# Patient Record
Sex: MALE | Race: WHITE | Employment: UNEMPLOYED | URBAN - METROPOLITAN AREA
[De-identification: names, ages, dates, MRNs, and addresses within clinical notes are randomized per-mention and may not be internally consistent; named-entity substitution may affect disease eponyms.]

---

## 2017-01-01 ENCOUNTER — OFFICE VISIT (OUTPATIENT)
Dept: HEMATOLOGY/ONCOLOGY | Facility: HOSPITAL | Age: 62
End: 2017-01-01
Attending: INTERNAL MEDICINE
Payer: COMMERCIAL

## 2017-01-01 ENCOUNTER — OFFICE VISIT (OUTPATIENT)
Dept: SURGERY | Facility: CLINIC | Age: 62
End: 2017-01-01

## 2017-01-01 ENCOUNTER — HOSPITAL ENCOUNTER (OUTPATIENT)
Dept: MRI IMAGING | Facility: HOSPITAL | Age: 62
Discharge: HOME | End: 2017-01-01
Attending: INTERNAL MEDICINE
Payer: COMMERCIAL

## 2017-01-01 ENCOUNTER — APPOINTMENT (OUTPATIENT)
Dept: HEMATOLOGY/ONCOLOGY | Facility: HOSPITAL | Age: 62
End: 2017-01-01
Payer: COMMERCIAL

## 2017-01-01 ENCOUNTER — HOSPITAL ENCOUNTER (OUTPATIENT)
Dept: CT IMAGING | Facility: HOSPITAL | Age: 62
Discharge: HOME | End: 2017-01-01
Attending: INTERNAL MEDICINE
Payer: COMMERCIAL

## 2017-01-01 ENCOUNTER — APPOINTMENT (OUTPATIENT)
Dept: RADIATION ONCOLOGY | Facility: HOSPITAL | Age: 62
End: 2017-01-01
Attending: RADIOLOGY
Payer: COMMERCIAL

## 2017-01-01 VITALS
OXYGEN SATURATION: 100 % | TEMPERATURE: 97 F | BODY MASS INDEX: 35.16 KG/M2 | SYSTOLIC BLOOD PRESSURE: 136 MMHG | HEIGHT: 66.69 IN | WEIGHT: 218.63 LBS | RESPIRATION RATE: 18 BRPM | DIASTOLIC BLOOD PRESSURE: 81 MMHG | HEART RATE: 46 BPM | SYSTOLIC BLOOD PRESSURE: 117 MMHG | RESPIRATION RATE: 18 BRPM | OXYGEN SATURATION: 98 % | TEMPERATURE: 97 F | DIASTOLIC BLOOD PRESSURE: 71 MMHG | BODY MASS INDEX: 34.72 KG/M2 | HEART RATE: 54 BPM | WEIGHT: 221.38 LBS | HEIGHT: 66.69 IN

## 2017-01-01 VITALS
RESPIRATION RATE: 18 BRPM | BODY MASS INDEX: 35.73 KG/M2 | DIASTOLIC BLOOD PRESSURE: 75 MMHG | TEMPERATURE: 98 F | WEIGHT: 225 LBS | HEIGHT: 66.69 IN | HEART RATE: 46 BPM | SYSTOLIC BLOOD PRESSURE: 129 MMHG

## 2017-01-01 VITALS
DIASTOLIC BLOOD PRESSURE: 81 MMHG | HEIGHT: 66.69 IN | TEMPERATURE: 97 F | BODY MASS INDEX: 34.88 KG/M2 | HEART RATE: 48 BPM | RESPIRATION RATE: 18 BRPM | WEIGHT: 219.63 LBS | SYSTOLIC BLOOD PRESSURE: 125 MMHG

## 2017-01-01 VITALS
HEIGHT: 66.5 IN | TEMPERATURE: 98 F | DIASTOLIC BLOOD PRESSURE: 77 MMHG | WEIGHT: 219 LBS | BODY MASS INDEX: 34.78 KG/M2 | HEART RATE: 55 BPM | SYSTOLIC BLOOD PRESSURE: 117 MMHG

## 2017-01-01 VITALS
OXYGEN SATURATION: 99 % | RESPIRATION RATE: 18 BRPM | BODY MASS INDEX: 34 KG/M2 | TEMPERATURE: 98 F | SYSTOLIC BLOOD PRESSURE: 146 MMHG | WEIGHT: 211 LBS | DIASTOLIC BLOOD PRESSURE: 84 MMHG | HEART RATE: 48 BPM

## 2017-01-01 VITALS
SYSTOLIC BLOOD PRESSURE: 131 MMHG | HEIGHT: 66.5 IN | HEART RATE: 58 BPM | DIASTOLIC BLOOD PRESSURE: 72 MMHG | WEIGHT: 222.5 LBS | SYSTOLIC BLOOD PRESSURE: 103 MMHG | RESPIRATION RATE: 18 BRPM | WEIGHT: 220 LBS | HEART RATE: 50 BPM | HEIGHT: 66.69 IN | OXYGEN SATURATION: 98 % | TEMPERATURE: 97 F | TEMPERATURE: 98 F | BODY MASS INDEX: 35.33 KG/M2 | DIASTOLIC BLOOD PRESSURE: 68 MMHG | BODY MASS INDEX: 34.94 KG/M2

## 2017-01-01 VITALS
DIASTOLIC BLOOD PRESSURE: 80 MMHG | HEART RATE: 51 BPM | OXYGEN SATURATION: 98 % | BODY MASS INDEX: 34.7 KG/M2 | SYSTOLIC BLOOD PRESSURE: 120 MMHG | HEIGHT: 66.69 IN | WEIGHT: 218.5 LBS | RESPIRATION RATE: 18 BRPM | TEMPERATURE: 96 F

## 2017-01-01 VITALS
TEMPERATURE: 98 F | HEART RATE: 57 BPM | RESPIRATION RATE: 16 BRPM | OXYGEN SATURATION: 99 % | BODY MASS INDEX: 35.26 KG/M2 | HEIGHT: 66.69 IN | WEIGHT: 222 LBS | DIASTOLIC BLOOD PRESSURE: 80 MMHG | SYSTOLIC BLOOD PRESSURE: 121 MMHG

## 2017-01-01 VITALS
RESPIRATION RATE: 18 BRPM | WEIGHT: 222.19 LBS | HEART RATE: 48 BPM | BODY MASS INDEX: 35.29 KG/M2 | TEMPERATURE: 97 F | HEIGHT: 66.69 IN | SYSTOLIC BLOOD PRESSURE: 134 MMHG | DIASTOLIC BLOOD PRESSURE: 74 MMHG | OXYGEN SATURATION: 98 %

## 2017-01-01 VITALS
TEMPERATURE: 97 F | HEART RATE: 48 BPM | DIASTOLIC BLOOD PRESSURE: 71 MMHG | SYSTOLIC BLOOD PRESSURE: 139 MMHG | BODY MASS INDEX: 33.97 KG/M2 | RESPIRATION RATE: 18 BRPM | HEIGHT: 66.22 IN | WEIGHT: 211.38 LBS | OXYGEN SATURATION: 97 %

## 2017-01-01 VITALS
WEIGHT: 215 LBS | DIASTOLIC BLOOD PRESSURE: 78 MMHG | OXYGEN SATURATION: 98 % | HEART RATE: 45 BPM | RESPIRATION RATE: 18 BRPM | BODY MASS INDEX: 34 KG/M2 | TEMPERATURE: 98 F | SYSTOLIC BLOOD PRESSURE: 135 MMHG

## 2017-01-01 VITALS
HEIGHT: 66.22 IN | TEMPERATURE: 97 F | BODY MASS INDEX: 33.43 KG/M2 | HEART RATE: 42 BPM | WEIGHT: 208 LBS | SYSTOLIC BLOOD PRESSURE: 121 MMHG | RESPIRATION RATE: 18 BRPM | DIASTOLIC BLOOD PRESSURE: 70 MMHG

## 2017-01-01 DIAGNOSIS — L03.313 CELLULITIS OF CHEST WALL: ICD-10-CM

## 2017-01-01 DIAGNOSIS — C34.92 NON-SMALL CELL CARCINOMA OF LEFT LUNG METASTATIC TO ABDOMEN (HCC): Primary | ICD-10-CM

## 2017-01-01 DIAGNOSIS — E83.42 HYPOMAGNESEMIA: Primary | ICD-10-CM

## 2017-01-01 DIAGNOSIS — K59.00 CONSTIPATION, UNSPECIFIED CONSTIPATION TYPE: ICD-10-CM

## 2017-01-01 DIAGNOSIS — Z72.820 POOR SLEEP: ICD-10-CM

## 2017-01-01 DIAGNOSIS — D64.81 ANEMIA ASSOCIATED WITH CHEMOTHERAPY: ICD-10-CM

## 2017-01-01 DIAGNOSIS — C79.89 NON-SMALL CELL CARCINOMA OF LEFT LUNG METASTATIC TO ABDOMEN (HCC): Primary | ICD-10-CM

## 2017-01-01 DIAGNOSIS — T45.1X5A ANEMIA ASSOCIATED WITH CHEMOTHERAPY: ICD-10-CM

## 2017-01-01 DIAGNOSIS — T45.1X5A CHANGE OF SKIN RELATED TO CHEMOTHERAPY: ICD-10-CM

## 2017-01-01 DIAGNOSIS — C34.92 NON-SMALL CELL CARCINOMA OF LEFT LUNG METASTATIC TO ABDOMEN (HCC): ICD-10-CM

## 2017-01-01 DIAGNOSIS — C79.89 NON-SMALL CELL CARCINOMA OF LEFT LUNG METASTATIC TO ABDOMEN (HCC): ICD-10-CM

## 2017-01-01 DIAGNOSIS — R23.9 CHANGE OF SKIN RELATED TO CHEMOTHERAPY: ICD-10-CM

## 2017-01-01 DIAGNOSIS — E83.42 HYPOMAGNESEMIA: ICD-10-CM

## 2017-01-01 DIAGNOSIS — D70.1 CHEMOTHERAPY INDUCED NEUTROPENIA (HCC): ICD-10-CM

## 2017-01-01 DIAGNOSIS — R11.2 CHEMOTHERAPY INDUCED NAUSEA AND VOMITING: ICD-10-CM

## 2017-01-01 DIAGNOSIS — T45.1X5A CHEMOTHERAPY INDUCED NEUTROPENIA (HCC): ICD-10-CM

## 2017-01-01 DIAGNOSIS — R19.8 ALTERNATING CONSTIPATION AND DIARRHEA: ICD-10-CM

## 2017-01-01 DIAGNOSIS — L72.3 INFECTED SEBACEOUS CYST: Primary | ICD-10-CM

## 2017-01-01 DIAGNOSIS — L72.3 INFECTED SEBACEOUS CYST: ICD-10-CM

## 2017-01-01 DIAGNOSIS — T45.1X5A CHEMOTHERAPY INDUCED NAUSEA AND VOMITING: ICD-10-CM

## 2017-01-01 DIAGNOSIS — C79.31 BRAIN METASTASIS (HCC): ICD-10-CM

## 2017-01-01 DIAGNOSIS — I25.10 CORONARY ARTERY DISEASE INVOLVING NATIVE CORONARY ARTERY OF NATIVE HEART WITHOUT ANGINA PECTORIS: ICD-10-CM

## 2017-01-01 DIAGNOSIS — L08.9 INFECTED SEBACEOUS CYST: Primary | ICD-10-CM

## 2017-01-01 DIAGNOSIS — D70.1 CHEMOTHERAPY INDUCED NEUTROPENIA (HCC): Primary | ICD-10-CM

## 2017-01-01 DIAGNOSIS — I48.0 PAROXYSMAL ATRIAL FIBRILLATION (HCC): ICD-10-CM

## 2017-01-01 DIAGNOSIS — D69.6 THROMBOCYTOPENIA (HCC): ICD-10-CM

## 2017-01-01 DIAGNOSIS — T45.1X5A CHEMOTHERAPY INDUCED NEUTROPENIA (HCC): Primary | ICD-10-CM

## 2017-01-01 DIAGNOSIS — L08.9 INFECTED SEBACEOUS CYST: ICD-10-CM

## 2017-01-01 DIAGNOSIS — E78.2 MIXED HYPERLIPIDEMIA: ICD-10-CM

## 2017-01-01 LAB
ALBUMIN SERPL-MCNC: 3.3 G/DL (ref 3.5–4.8)
ALP LIVER SERPL-CCNC: 44 U/L (ref 45–117)
ALT SERPL-CCNC: 21 U/L (ref 17–63)
AST SERPL-CCNC: 23 U/L (ref 15–41)
BASOPHILS # BLD AUTO: 0.03 X10(3) UL (ref 0–0.1)
BASOPHILS # BLD AUTO: 0.04 X10(3) UL (ref 0–0.1)
BASOPHILS NFR BLD AUTO: 0.7 %
BASOPHILS NFR BLD AUTO: 1.5 %
BILIRUB SERPL-MCNC: 0.4 MG/DL (ref 0.1–2)
BUN BLD-MCNC: 11 MG/DL (ref 8–20)
BUN BLD-MCNC: 12 MG/DL (ref 8–20)
CALCIUM BLD-MCNC: 8.6 MG/DL (ref 8.3–10.3)
CALCIUM BLD-MCNC: 8.6 MG/DL (ref 8.3–10.3)
CHLORIDE: 107 MMOL/L (ref 101–111)
CHLORIDE: 109 MMOL/L (ref 101–111)
CO2: 26 MMOL/L (ref 22–32)
CO2: 27 MMOL/L (ref 22–32)
CREAT BLD-MCNC: 0.84 MG/DL (ref 0.7–1.3)
CREAT BLD-MCNC: 0.85 MG/DL (ref 0.7–1.3)
EOSINOPHIL # BLD AUTO: 0.02 X10(3) UL (ref 0–0.3)
EOSINOPHIL # BLD AUTO: 0.12 X10(3) UL (ref 0–0.3)
EOSINOPHIL NFR BLD AUTO: 0.7 %
EOSINOPHIL NFR BLD AUTO: 2.9 %
ERYTHROCYTE [DISTWIDTH] IN BLOOD BY AUTOMATED COUNT: 16.1 % (ref 11.5–16)
ERYTHROCYTE [DISTWIDTH] IN BLOOD BY AUTOMATED COUNT: 17.2 % (ref 11.5–16)
GLUCOSE BLD-MCNC: 76 MG/DL (ref 70–99)
GLUCOSE BLD-MCNC: 88 MG/DL (ref 70–99)
HAV IGM SER QL: 1.4 MG/DL (ref 1.7–3)
HAV IGM SER QL: 1.8 MG/DL (ref 1.7–3)
HCT VFR BLD AUTO: 32.4 % (ref 37–53)
HCT VFR BLD AUTO: 34.7 % (ref 37–53)
HGB BLD-MCNC: 10.5 G/DL (ref 13–17)
HGB BLD-MCNC: 11.5 G/DL (ref 13–17)
IMMATURE GRANULOCYTE COUNT: 0.01 X10(3) UL (ref 0–1)
IMMATURE GRANULOCYTE COUNT: 0.06 X10(3) UL (ref 0–1)
IMMATURE GRANULOCYTE RATIO %: 0.2 %
IMMATURE GRANULOCYTE RATIO %: 2.2 %
LYMPHOCYTES # BLD AUTO: 1.4 X10(3) UL (ref 0.9–4)
LYMPHOCYTES # BLD AUTO: 1.41 X10(3) UL (ref 0.9–4)
LYMPHOCYTES NFR BLD AUTO: 33.6 %
LYMPHOCYTES NFR BLD AUTO: 52 %
M PROTEIN MFR SERPL ELPH: 6.4 G/DL (ref 6.1–8.3)
MCH RBC QN AUTO: 30.8 PG (ref 27–33.2)
MCH RBC QN AUTO: 31.7 PG (ref 27–33.2)
MCHC RBC AUTO-ENTMCNC: 32.4 G/DL (ref 31–37)
MCHC RBC AUTO-ENTMCNC: 33.1 G/DL (ref 31–37)
MCV RBC AUTO: 95 FL (ref 80–99)
MCV RBC AUTO: 95.6 FL (ref 80–99)
MONOCYTES # BLD AUTO: 0.16 X10(3) UL (ref 0.1–0.6)
MONOCYTES # BLD AUTO: 0.75 X10(3) UL (ref 0.1–0.6)
MONOCYTES NFR BLD AUTO: 18 %
MONOCYTES NFR BLD AUTO: 5.9 %
NEUTROPHIL ABS PRELIM: 1.02 X10 (3) UL (ref 1.3–6.7)
NEUTROPHIL ABS PRELIM: 1.86 X10 (3) UL (ref 1.3–6.7)
NEUTROPHILS # BLD AUTO: 1.02 X10(3) UL (ref 1.3–6.7)
NEUTROPHILS # BLD AUTO: 1.86 X10(3) UL (ref 1.3–6.7)
NEUTROPHILS NFR BLD AUTO: 37.7 %
NEUTROPHILS NFR BLD AUTO: 44.6 %
PLATELET # BLD AUTO: 247 10(3)UL (ref 150–450)
PLATELET # BLD AUTO: 306 10(3)UL (ref 150–450)
POTASSIUM SERPL-SCNC: 3.8 MMOL/L (ref 3.6–5.1)
POTASSIUM SERPL-SCNC: 4.1 MMOL/L (ref 3.6–5.1)
RBC # BLD AUTO: 3.41 X10(6)UL (ref 4.3–5.7)
RBC # BLD AUTO: 3.63 X10(6)UL (ref 4.3–5.7)
RED CELL DISTRIBUTION WIDTH-SD: 55.3 FL (ref 35.1–46.3)
RED CELL DISTRIBUTION WIDTH-SD: 59.3 FL (ref 35.1–46.3)
SODIUM SERPL-SCNC: 140 MMOL/L (ref 136–144)
SODIUM SERPL-SCNC: 143 MMOL/L (ref 136–144)
WBC # BLD AUTO: 2.7 X10(3) UL (ref 4–13)
WBC # BLD AUTO: 4.2 X10(3) UL (ref 4–13)

## 2017-01-01 PROCEDURE — 99215 OFFICE O/P EST HI 40 MIN: CPT | Performed by: INTERNAL MEDICINE

## 2017-01-01 PROCEDURE — 83735 ASSAY OF MAGNESIUM: CPT

## 2017-01-01 PROCEDURE — 10061 I&D ABSCESS COMP/MULTIPLE: CPT | Performed by: SURGERY

## 2017-01-01 PROCEDURE — 96375 TX/PRO/DX INJ NEW DRUG ADDON: CPT

## 2017-01-01 PROCEDURE — 71260 CT THORAX DX C+: CPT | Performed by: INTERNAL MEDICINE

## 2017-01-01 PROCEDURE — 96417 CHEMO IV INFUS EACH ADDL SEQ: CPT

## 2017-01-01 PROCEDURE — 80048 BASIC METABOLIC PNL TOTAL CA: CPT

## 2017-01-01 PROCEDURE — 85025 COMPLETE CBC W/AUTO DIFF WBC: CPT

## 2017-01-01 PROCEDURE — 96367 TX/PROPH/DG ADDL SEQ IV INF: CPT

## 2017-01-01 PROCEDURE — 96413 CHEMO IV INFUSION 1 HR: CPT

## 2017-01-01 PROCEDURE — 80053 COMPREHEN METABOLIC PANEL: CPT

## 2017-01-01 PROCEDURE — A9575 INJ GADOTERATE MEGLUMI 0.1ML: HCPCS | Performed by: INTERNAL MEDICINE

## 2017-01-01 PROCEDURE — 99211 OFF/OP EST MAY X REQ PHY/QHP: CPT

## 2017-01-01 PROCEDURE — 70553 MRI BRAIN STEM W/O & W/DYE: CPT | Performed by: INTERNAL MEDICINE

## 2017-01-01 PROCEDURE — 96366 THER/PROPH/DIAG IV INF ADDON: CPT

## 2017-01-01 PROCEDURE — 96365 THER/PROPH/DIAG IV INF INIT: CPT

## 2017-01-01 PROCEDURE — 96361 HYDRATE IV INFUSION ADD-ON: CPT

## 2017-01-01 PROCEDURE — 99214 OFFICE O/P EST MOD 30 MIN: CPT | Performed by: NURSE PRACTITIONER

## 2017-01-01 PROCEDURE — 96372 THER/PROPH/DIAG INJ SC/IM: CPT

## 2017-01-01 PROCEDURE — 96376 TX/PRO/DX INJ SAME DRUG ADON: CPT

## 2017-01-01 PROCEDURE — 74177 CT ABD & PELVIS W/CONTRAST: CPT | Performed by: INTERNAL MEDICINE

## 2017-01-01 PROCEDURE — 99243 OFF/OP CNSLTJ NEW/EST LOW 30: CPT | Performed by: SURGERY

## 2017-01-01 PROCEDURE — 99212 OFFICE O/P EST SF 10 MIN: CPT | Performed by: SURGERY

## 2017-01-01 PROCEDURE — 99024 POSTOP FOLLOW-UP VISIT: CPT | Performed by: SURGERY

## 2017-01-01 PROCEDURE — 80061 LIPID PANEL: CPT

## 2017-01-01 RX ORDER — PROCHLORPERAZINE MALEATE 10 MG
10 TABLET ORAL EVERY 6 HOURS PRN
Status: CANCELLED | OUTPATIENT
Start: 2017-01-01

## 2017-01-01 RX ORDER — SODIUM CHLORIDE 0.9 % (FLUSH) 0.9 %
10 SYRINGE (ML) INJECTION ONCE
Status: COMPLETED | OUTPATIENT
Start: 2017-01-01 | End: 2017-01-01

## 2017-01-01 RX ORDER — LORAZEPAM 0.5 MG/1
TABLET ORAL EVERY 4 HOURS PRN
Status: CANCELLED | OUTPATIENT
Start: 2017-01-01

## 2017-01-01 RX ORDER — ONDANSETRON 2 MG/ML
8 INJECTION INTRAMUSCULAR; INTRAVENOUS EVERY 6 HOURS PRN
Status: CANCELLED | OUTPATIENT
Start: 2017-01-01

## 2017-01-01 RX ORDER — DIPHENHYDRAMINE HYDROCHLORIDE 50 MG/ML
INJECTION INTRAMUSCULAR; INTRAVENOUS EVERY 4 HOURS PRN
Status: CANCELLED | OUTPATIENT
Start: 2017-01-01

## 2017-01-01 RX ORDER — SODIUM CHLORIDE 0.9 % (FLUSH) 0.9 %
10 SYRINGE (ML) INJECTION ONCE
Status: CANCELLED | OUTPATIENT
Start: 2017-01-01

## 2017-01-01 RX ORDER — ALBUTEROL SULFATE 90 UG/1
2 AEROSOL, METERED RESPIRATORY (INHALATION) AS NEEDED
Status: CANCELLED | OUTPATIENT
Start: 2017-01-01

## 2017-01-01 RX ORDER — LORAZEPAM 2 MG/ML
INJECTION INTRAMUSCULAR EVERY 4 HOURS PRN
Status: CANCELLED | OUTPATIENT
Start: 2017-01-01

## 2017-01-01 RX ORDER — METOCLOPRAMIDE HYDROCHLORIDE 5 MG/ML
10 INJECTION INTRAMUSCULAR; INTRAVENOUS EVERY 6 HOURS PRN
Status: CANCELLED | OUTPATIENT
Start: 2017-01-01

## 2017-01-01 RX ORDER — ACETAMINOPHEN 325 MG/1
TABLET ORAL EVERY 6 HOURS PRN
Status: CANCELLED | OUTPATIENT
Start: 2017-01-01

## 2017-01-01 RX ORDER — RANITIDINE 25 MG/ML
50 INJECTION, SOLUTION INTRAMUSCULAR; INTRAVENOUS AS NEEDED
Status: CANCELLED | OUTPATIENT
Start: 2017-01-01

## 2017-01-01 RX ORDER — ASPIRIN 81 MG
TABLET, DELAYED RELEASE (ENTERIC COATED) ORAL
Qty: 90 TABLET | Refills: 2 | OUTPATIENT
Start: 2017-01-01

## 2017-01-01 RX ORDER — MEPERIDINE HYDROCHLORIDE 25 MG/ML
INJECTION INTRAMUSCULAR; INTRAVENOUS; SUBCUTANEOUS AS NEEDED
Status: CANCELLED | OUTPATIENT
Start: 2017-01-01

## 2017-01-01 RX ORDER — SODIUM CHLORIDE 9 MG/ML
1000 INJECTION, SOLUTION INTRAVENOUS ONCE
Status: COMPLETED | OUTPATIENT
Start: 2017-01-01 | End: 2017-01-01

## 2017-01-01 RX ORDER — MINOCYCLINE HYDROCHLORIDE 100 MG/1
100 TABLET ORAL 2 TIMES DAILY
Qty: 60 TABLET | Refills: 3 | Status: SHIPPED | OUTPATIENT
Start: 2017-01-01 | End: 2018-01-01

## 2017-01-01 RX ORDER — FUROSEMIDE 10 MG/ML
10 INJECTION INTRAMUSCULAR; INTRAVENOUS ONCE
Status: COMPLETED | OUTPATIENT
Start: 2017-01-01 | End: 2017-01-01

## 2017-01-01 RX ORDER — SODIUM CHLORIDE 9 MG/ML
1000 INJECTION, SOLUTION INTRAVENOUS ONCE
Status: CANCELLED | OUTPATIENT
Start: 2017-01-01

## 2017-01-01 RX ORDER — PRAVASTATIN SODIUM 20 MG
TABLET ORAL
Qty: 90 TABLET | Refills: 0 | Status: SHIPPED | OUTPATIENT
Start: 2017-01-01 | End: 2018-01-01

## 2017-01-01 RX ORDER — FUROSEMIDE 10 MG/ML
10 INJECTION INTRAMUSCULAR; INTRAVENOUS ONCE
Status: CANCELLED
Start: 2017-01-01 | End: 2017-01-01

## 2017-01-01 RX ORDER — AMOXICILLIN AND CLAVULANATE POTASSIUM 875; 125 MG/1; MG/1
1 TABLET, FILM COATED ORAL 2 TIMES DAILY
Qty: 20 TABLET | Refills: 0 | Status: SHIPPED | OUTPATIENT
Start: 2017-01-01 | End: 2017-01-01 | Stop reason: ALTCHOICE

## 2017-01-01 RX ORDER — SODIUM CHLORIDE 9 MG/ML
1000 INJECTION, SOLUTION INTRAVENOUS ONCE
Status: DISCONTINUED | OUTPATIENT
Start: 2017-01-01 | End: 2017-01-01

## 2017-01-01 RX ORDER — DOXYCYCLINE HYCLATE 100 MG/1
CAPSULE ORAL
Qty: 60 CAPSULE | Refills: 0 | Status: SHIPPED | OUTPATIENT
Start: 2017-01-01 | End: 2017-01-01

## 2017-01-01 RX ADMIN — SODIUM CHLORIDE 1000 ML: 9 INJECTION, SOLUTION INTRAVENOUS at 16:06:00

## 2017-01-01 RX ADMIN — SODIUM CHLORIDE 0.9 % (FLUSH) 10 ML: 0.9 % SYRINGE (ML) INJECTION at 13:25:00

## 2017-01-01 RX ADMIN — SODIUM CHLORIDE 0.9 % (FLUSH) 10 ML: 0.9 % SYRINGE (ML) INJECTION at 11:00:00

## 2017-01-01 RX ADMIN — SODIUM CHLORIDE 0.9 % (FLUSH) 10 ML: 0.9 % SYRINGE (ML) INJECTION at 09:15:00

## 2017-01-01 RX ADMIN — SODIUM CHLORIDE 0.9 % (FLUSH) 10 ML: 0.9 % SYRINGE (ML) INJECTION at 16:15:00

## 2017-01-01 RX ADMIN — SODIUM CHLORIDE 0.9 % (FLUSH) 10 ML: 0.9 % SYRINGE (ML) INJECTION at 16:35:00

## 2017-01-01 RX ADMIN — SODIUM CHLORIDE 0.9 % (FLUSH) 10 ML: 0.9 % SYRINGE (ML) INJECTION at 12:45:00

## 2017-01-01 RX ADMIN — SODIUM CHLORIDE 0.9 % (FLUSH) 10 ML: 0.9 % SYRINGE (ML) INJECTION at 11:45:00

## 2017-01-01 RX ADMIN — SODIUM CHLORIDE 0.9 % (FLUSH) 10 ML: 0.9 % SYRINGE (ML) INJECTION at 12:00:00

## 2017-01-01 RX ADMIN — FUROSEMIDE 10 MG: 10 INJECTION INTRAMUSCULAR; INTRAVENOUS at 14:32:00

## 2017-01-01 RX ADMIN — SODIUM CHLORIDE 1000 ML: 9 INJECTION, SOLUTION INTRAVENOUS at 15:25:00

## 2017-01-01 RX ADMIN — SODIUM CHLORIDE 0.9 % (FLUSH) 10 ML: 0.9 % SYRINGE (ML) INJECTION at 13:00:00

## 2017-02-02 DIAGNOSIS — I25.10 CORONARY ARTERY DISEASE INVOLVING NATIVE CORONARY ARTERY OF NATIVE HEART WITHOUT ANGINA PECTORIS: ICD-10-CM

## 2017-02-02 DIAGNOSIS — E78.2 MIXED HYPERLIPIDEMIA: ICD-10-CM

## 2017-02-02 RX ORDER — PRAVASTATIN SODIUM 20 MG
20 TABLET ORAL NIGHTLY
Qty: 90 TABLET | Refills: 3 | Status: CANCELLED | OUTPATIENT
Start: 2017-02-02

## 2017-02-02 NOTE — TELEPHONE ENCOUNTER
From: Paras Banda  To:  Merced Johns DO  Sent: 2/2/2017 1:10 PM CST  Subject: Medication Renewal Request    Original authorizing provider: DO Paras Kinsey would like a refill of the following medications:  Pravastatin Sodium 20 M

## 2017-02-08 ENCOUNTER — HOSPITAL ENCOUNTER (OUTPATIENT)
Dept: INTERVENTIONAL RADIOLOGY/VASCULAR | Facility: HOSPITAL | Age: 62
Setting detail: OBSERVATION
Discharge: HOME | End: 2017-02-09
Attending: INTERNAL MEDICINE | Admitting: INTERNAL MEDICINE
Payer: COMMERCIAL

## 2017-02-08 DIAGNOSIS — R94.39 ABNORMAL STRESS TEST: ICD-10-CM

## 2017-02-08 LAB
BUN BLD-MCNC: 7 MG/DL (ref 8–20)
CALCIUM BLD-MCNC: 8.4 MG/DL (ref 8.3–10.3)
CHLORIDE: 110 MMOL/L (ref 101–111)
CO2: 27 MMOL/L (ref 22–32)
CREAT BLD-MCNC: 0.8 MG/DL (ref 0.7–1.3)
GLUCOSE BLD-MCNC: 78 MG/DL (ref 70–99)
ISTAT ACTIVATED CLOTTING TIME: 322 SECONDS (ref 74–137)
POTASSIUM SERPL-SCNC: 4.1 MMOL/L (ref 3.6–5.1)
SODIUM SERPL-SCNC: 144 MMOL/L (ref 136–144)

## 2017-02-08 PROCEDURE — 99152 MOD SED SAME PHYS/QHP 5/>YRS: CPT

## 2017-02-08 PROCEDURE — B2151ZZ FLUOROSCOPY OF LEFT HEART USING LOW OSMOLAR CONTRAST: ICD-10-PCS | Performed by: INTERNAL MEDICINE

## 2017-02-08 PROCEDURE — 93459 L HRT ART/GRFT ANGIO: CPT

## 2017-02-08 PROCEDURE — 80048 BASIC METABOLIC PNL TOTAL CA: CPT | Performed by: INTERNAL MEDICINE

## 2017-02-08 PROCEDURE — 99153 MOD SED SAME PHYS/QHP EA: CPT

## 2017-02-08 PROCEDURE — B2181ZZ FLUOROSCOPY OF LEFT INTERNAL MAMMARY BYPASS GRAFT USING LOW OSMOLAR CONTRAST: ICD-10-PCS | Performed by: INTERNAL MEDICINE

## 2017-02-08 PROCEDURE — 027034Z DILATION OF CORONARY ARTERY, ONE ARTERY WITH DRUG-ELUTING INTRALUMINAL DEVICE, PERCUTANEOUS APPROACH: ICD-10-PCS | Performed by: INTERNAL MEDICINE

## 2017-02-08 PROCEDURE — 4A023N7 MEASUREMENT OF CARDIAC SAMPLING AND PRESSURE, LEFT HEART, PERCUTANEOUS APPROACH: ICD-10-PCS | Performed by: INTERNAL MEDICINE

## 2017-02-08 PROCEDURE — 85347 COAGULATION TIME ACTIVATED: CPT

## 2017-02-08 PROCEDURE — B2131ZZ FLUOROSCOPY OF MULTIPLE CORONARY ARTERY BYPASS GRAFTS USING LOW OSMOLAR CONTRAST: ICD-10-PCS | Performed by: INTERNAL MEDICINE

## 2017-02-08 PROCEDURE — 93010 ELECTROCARDIOGRAM REPORT: CPT | Performed by: INTERNAL MEDICINE

## 2017-02-08 PROCEDURE — B2111ZZ FLUOROSCOPY OF MULTIPLE CORONARY ARTERIES USING LOW OSMOLAR CONTRAST: ICD-10-PCS | Performed by: INTERNAL MEDICINE

## 2017-02-08 PROCEDURE — 93005 ELECTROCARDIOGRAM TRACING: CPT

## 2017-02-08 RX ORDER — CARVEDILOL 6.25 MG/1
6.25 TABLET ORAL 2 TIMES DAILY WITH MEALS
Status: DISCONTINUED | OUTPATIENT
Start: 2017-02-08 | End: 2017-02-09

## 2017-02-08 RX ORDER — ACETAMINOPHEN AND CODEINE PHOSPHATE 300; 30 MG/1; MG/1
1 TABLET ORAL EVERY 4 HOURS PRN
Status: DISCONTINUED | OUTPATIENT
Start: 2017-02-08 | End: 2017-02-09

## 2017-02-08 RX ORDER — CLOPIDOGREL BISULFATE 75 MG/1
75 TABLET ORAL DAILY
Status: DISCONTINUED | OUTPATIENT
Start: 2017-02-09 | End: 2017-02-09

## 2017-02-08 RX ORDER — SODIUM CHLORIDE 9 MG/ML
INJECTION, SOLUTION INTRAVENOUS CONTINUOUS
Status: DISCONTINUED | OUTPATIENT
Start: 2017-02-08 | End: 2017-02-09

## 2017-02-08 RX ORDER — ASPIRIN 325 MG
325 TABLET ORAL DAILY
Status: DISCONTINUED | OUTPATIENT
Start: 2017-02-09 | End: 2017-02-09

## 2017-02-08 RX ORDER — HEPARIN SODIUM 5000 [USP'U]/ML
INJECTION, SOLUTION INTRAVENOUS; SUBCUTANEOUS
Status: COMPLETED
Start: 2017-02-08 | End: 2017-02-08

## 2017-02-08 RX ORDER — ACETAMINOPHEN 325 MG/1
650 TABLET ORAL EVERY 4 HOURS PRN
Status: DISCONTINUED | OUTPATIENT
Start: 2017-02-08 | End: 2017-02-09

## 2017-02-08 RX ORDER — SODIUM CHLORIDE 9 MG/ML
INJECTION, SOLUTION INTRAVENOUS CONTINUOUS
Status: ACTIVE | OUTPATIENT
Start: 2017-02-08 | End: 2017-02-08

## 2017-02-08 RX ORDER — ACETAMINOPHEN AND CODEINE PHOSPHATE 300; 30 MG/1; MG/1
2 TABLET ORAL EVERY 4 HOURS PRN
Status: DISCONTINUED | OUTPATIENT
Start: 2017-02-08 | End: 2017-02-09

## 2017-02-08 RX ORDER — DILTIAZEM HYDROCHLORIDE 5 MG/ML
INJECTION INTRAVENOUS
Status: COMPLETED
Start: 2017-02-08 | End: 2017-02-08

## 2017-02-08 RX ORDER — LIDOCAINE HYDROCHLORIDE 10 MG/ML
INJECTION, SOLUTION INFILTRATION; PERINEURAL
Status: COMPLETED
Start: 2017-02-08 | End: 2017-02-08

## 2017-02-08 RX ORDER — PRAVASTATIN SODIUM 20 MG
80 TABLET ORAL NIGHTLY
Status: DISCONTINUED | OUTPATIENT
Start: 2017-02-08 | End: 2017-02-09

## 2017-02-08 RX ORDER — MIDAZOLAM HYDROCHLORIDE 1 MG/ML
INJECTION INTRAMUSCULAR; INTRAVENOUS
Status: COMPLETED
Start: 2017-02-08 | End: 2017-02-08

## 2017-02-08 RX ORDER — CLOPIDOGREL BISULFATE 75 MG/1
TABLET ORAL
Status: COMPLETED
Start: 2017-02-08 | End: 2017-02-08

## 2017-02-08 RX ADMIN — CARVEDILOL 6.25 MG: 6.25 TABLET ORAL at 18:26:00

## 2017-02-08 RX ADMIN — PRAVASTATIN SODIUM 80 MG: 20 MG TABLET ORAL at 21:00:00

## 2017-02-08 NOTE — H&P
Interventional cardiology H&P update:        Patient personally interviewed and examined in the holding area.     There has been no significant change since outpatient history and physical.                              Gail Marx M.D.

## 2017-02-08 NOTE — BRIEF PROCEDURE NOTE
[address] Patient Status:  Outpatient in a Bed    1955 MRN [id number]   Location [facility] [address] [facility] Attending Sergio Mott MD   [facility] Day # 0 PCP Getachew Tate DO                            Cardiac Ca follow. Lashon Ocasio M.D.   [address] [facility] Cardiology         2/8/2017   12:57 PM

## 2017-02-08 NOTE — PLAN OF CARE
NURSING ADMISSION NOTE      Patient admitted via bed from cath lab holding in a sitting position. Oriented to room. Safety precautions initiated. Bed in low position. Call light in reach. Tele placed on patient. IV flushed and capped.  Right groi

## 2017-02-09 VITALS
HEIGHT: 70 IN | DIASTOLIC BLOOD PRESSURE: 65 MMHG | HEART RATE: 58 BPM | WEIGHT: 187 LBS | OXYGEN SATURATION: 96 % | SYSTOLIC BLOOD PRESSURE: 120 MMHG | BODY MASS INDEX: 26.77 KG/M2 | RESPIRATION RATE: 18 BRPM | TEMPERATURE: 98 F

## 2017-02-09 PROBLEM — R94.39 ABNORMAL STRESS TEST: Status: ACTIVE | Noted: 2017-02-09

## 2017-02-09 LAB
ATRIAL RATE: 47 BPM
BUN BLD-MCNC: 12 MG/DL (ref 8–20)
CALCIUM BLD-MCNC: 9.1 MG/DL (ref 8.3–10.3)
CHLORIDE: 107 MMOL/L (ref 101–111)
CO2: 26 MMOL/L (ref 22–32)
CREAT BLD-MCNC: 0.95 MG/DL (ref 0.7–1.3)
ERYTHROCYTE [DISTWIDTH] IN BLOOD BY AUTOMATED COUNT: 13.2 % (ref 11.5–16)
GLUCOSE BLD-MCNC: 76 MG/DL (ref 70–99)
HCT VFR BLD AUTO: 43.4 % (ref 37–53)
HGB BLD-MCNC: 14.6 G/DL (ref 13–17)
MCH RBC QN AUTO: 30.4 PG (ref 27–33.2)
MCHC RBC AUTO-ENTMCNC: 33.6 G/DL (ref 31–37)
MCV RBC AUTO: 90.4 FL (ref 80–99)
P AXIS: 51 DEGREES
P-R INTERVAL: 186 MS
PLATELET # BLD AUTO: 168 10(3)UL (ref 150–450)
POTASSIUM SERPL-SCNC: 4.1 MMOL/L (ref 3.6–5.1)
Q-T INTERVAL: 486 MS
QRS DURATION: 94 MS
QTC CALCULATION (BEZET): 430 MS
R AXIS: -16 DEGREES
RBC # BLD AUTO: 4.8 X10(6)UL (ref 4.3–5.7)
RED CELL DISTRIBUTION WIDTH-SD: 43.8 FL (ref 35.1–46.3)
SODIUM SERPL-SCNC: 141 MMOL/L (ref 136–144)
T AXIS: 47 DEGREES
VENTRICULAR RATE: 47 BPM
WBC # BLD AUTO: 7.6 X10(3) UL (ref 4–13)

## 2017-02-09 PROCEDURE — 85027 COMPLETE CBC AUTOMATED: CPT | Performed by: INTERNAL MEDICINE

## 2017-02-09 PROCEDURE — 80048 BASIC METABOLIC PNL TOTAL CA: CPT | Performed by: INTERNAL MEDICINE

## 2017-02-09 PROCEDURE — 99211 OFF/OP EST MAY X REQ PHY/QHP: CPT

## 2017-02-09 RX ORDER — ASPIRIN 325 MG
TABLET ORAL
Qty: 90 TABLET | Refills: 1 | Status: SHIPPED | OUTPATIENT
Start: 2017-02-09 | End: 2017-06-21

## 2017-02-09 RX ORDER — AMIODARONE HYDROCHLORIDE 200 MG/1
200 TABLET ORAL DAILY
Qty: 90 TABLET | Refills: 3 | Status: SHIPPED | OUTPATIENT
Start: 2017-02-09 | End: 2018-01-01

## 2017-02-09 RX ORDER — ASPIRIN 325 MG
TABLET ORAL
Qty: 90 TABLET | Refills: 1 | Status: SHIPPED | OUTPATIENT
Start: 2017-02-09 | End: 2017-02-09

## 2017-02-09 RX ORDER — CLOPIDOGREL BISULFATE 75 MG/1
75 TABLET ORAL DAILY
Qty: 90 TABLET | Refills: 3 | Status: SHIPPED | OUTPATIENT
Start: 2017-02-09 | End: 2017-01-01 | Stop reason: ALTCHOICE

## 2017-02-09 RX ADMIN — CARVEDILOL 6.25 MG: 6.25 TABLET ORAL at 08:35:00

## 2017-02-09 RX ADMIN — ASPIRIN 325 MG: 325 MG TABLET ORAL at 08:35:00

## 2017-02-09 RX ADMIN — CLOPIDOGREL BISULFATE 75 MG: 75 TABLET ORAL at 08:35:00

## 2017-02-09 NOTE — CARDIAC REHAB
CAD education completed with patient. Gave patient information about Romel Salgado. Patient did not want to sign up for cardiac rehab at this time. Stent card given to patient.

## 2017-02-09 NOTE — PLAN OF CARE
CARDIOVASCULAR - ADULT    • Maintains optimal cardiac output and hemodynamic stability Progressing    • Absence of cardiac arrhythmias or at baseline Progressing        02.08.17 2100 on & off cp for the last 2 months, not always exertional   Abnormal nucle

## 2017-02-09 NOTE — PLAN OF CARE
NURSING DISCHARGE NOTE    Discharged home via private car. Accompanied by wife. Belongings sent home with patient. Tele off. IV removed. All discharge instructions reviewed with the patient. All questions and concerns addressed.

## 2017-02-09 NOTE — PAYOR COMM NOTE
Attending Physician: Chiqui Dumont MD    Review Type: ADMISSION   Reviewer: Srikanth Bolivar       Date: February 9, 2017 - 9:02 AM  Payor: Maximo OROZCO/JHOAN  Authorization Number: none reqd  Admit date: 2/8/2017  8:11 AM   Admitted from Emergency Dept.

## 2017-02-09 NOTE — DIETARY NOTE
Nutrition Short Note   Dietitian consult received per cardiac rehab standing order. Pt to be educated by cardiac rehab staff and encouraged to attend outpatient classes taught by RD. RD available PRN.      Kathy Zapata RD, LDN  Pager [phone]

## 2017-02-09 NOTE — PROCEDURES
[address] Patient Status:  Observation    1955 MRN [id number]   [facility] 2NE-A Attending Martha Savage MD   Hosp Day # 0 PCP Debbie Faria DO                                              Cardiac Catheter angiography was performed in multiple projections.   That same catheter was manipulated into the left subclavian artery and engaged in the left internal mammary artery and selective left internal Delwin Asai mammary artery angiography is accomplished in multiple saphenous vein bypass graft going from the ascending aorta to the obtuse marginal branch. Proximal and distal anastomosis is appear to be patent.   There is a 99% subtotal stenosis in the proximal segment of the vein graft with only LUCAS grade II flow dist

## 2017-02-09 NOTE — PROGRESS NOTES
[facility] Cardiology Progress Note        Janet Burns Patient Status:  Observation    1955 MRN [id number]   [facility] 2NE-A Attending Jesse Rehman MD   [facility] Day # 1 PCP Padmini Romano good chest wall expansion  Abdomen: Soft, non-tender, non-distended.     Extremities:  Peripheral pulses are  2+, no edema.,  Right groin dry, no hematoma minimal ecchymosis  Neurologic: Alert , ambulatory  Skin: Warm and dry, no obvious rashes     MEDICATI

## 2017-02-09 NOTE — PROCEDURES
[facility]    PATIENT'S NAME: Teresa Meyer   ATTENDING PHYSICIAN: Stuart Haro M.D. OPERATING PHYSICIAN: Stuart Haro M.D.    PATIENT ACCOUNT#:   [de-identified]    LOCATION:  [address] [place] [address]  MEDICAL RECORD #:   [id number]       DATE OF AJITH Post-PTCA angiography now showed approximately 80% residual stenosis, but now there was LUCAS grade 3 distal flow distally. Then, the original 2.5 x 12 Trek balloon was prepared. It was loaded onto the guidewire and advanced to the site of the lesion.   Tw

## 2017-04-12 ENCOUNTER — PATIENT MESSAGE (OUTPATIENT)
Dept: FAMILY MEDICINE CLINIC | Facility: CLINIC | Age: 62
End: 2017-04-12

## 2017-04-12 NOTE — TELEPHONE ENCOUNTER
From: Alfonso Doyle  To: Daniel Leung DO  Sent: 4/12/2017 8:32 AM CDT  Subject: Visit Follow-up Question    I am spitting up very minute amounts of blood every so often. It may be related to my inability to burp which still randomly occurs.  Should I s

## 2017-05-16 NOTE — TELEPHONE ENCOUNTER
Pt requesting refill on vitamin, no protocol, unable to approve    LOV 11/7/16    LF 12/12/16 #30 w/3    Future Appointments  Date Time Provider Sultana Miller   6/12/2017 10:50 AM Esperanza Gonzalez  [address]

## 2017-05-16 NOTE — TELEPHONE ENCOUNTER
From: Cristiana Zhou  To:  Christi Mena DO  Sent: 5/16/2017 2:49 PM CDT  Subject: Medication Renewal Request    Original authorizing provider: DO Cristiana Hastings would like a refill of the following medications:  TAB-A-HERMES Oral Tab [L

## 2017-05-17 RX ORDER — MULTIVITAMIN WITH FOLIC ACID 400 MCG
1 TABLET ORAL
Qty: 30 TABLET | Refills: 12 | Status: SHIPPED | OUTPATIENT
Start: 2017-05-17

## 2017-05-26 PROBLEM — R10.13 DYSPEPSIA: Status: ACTIVE | Noted: 2017-05-25

## 2017-06-16 NOTE — TELEPHONE ENCOUNTER
Scheduled patient an appointment over the phone. Patient states that is concerned as episodes of  Blood in sputum seem to be more frequent, patient reports that blood is still trace.  Directed patient to seek emergency treatment if more than a nickel size a

## 2017-06-19 ENCOUNTER — OFFICE VISIT (OUTPATIENT)
Dept: FAMILY MEDICINE CLINIC | Facility: CLINIC | Age: 62
End: 2017-06-19

## 2017-06-19 VITALS
OXYGEN SATURATION: 98 % | SYSTOLIC BLOOD PRESSURE: 102 MMHG | DIASTOLIC BLOOD PRESSURE: 68 MMHG | TEMPERATURE: 98 F | WEIGHT: 187 LBS | HEART RATE: 45 BPM | RESPIRATION RATE: 17 BRPM | BODY MASS INDEX: 27 KG/M2

## 2017-06-19 DIAGNOSIS — R04.2 HEMOPTYSIS: Primary | ICD-10-CM

## 2017-06-19 DIAGNOSIS — I25.10 CORONARY ARTERY DISEASE INVOLVING NATIVE CORONARY ARTERY OF NATIVE HEART WITHOUT ANGINA PECTORIS: ICD-10-CM

## 2017-06-19 DIAGNOSIS — I48.91 ATRIAL FIBRILLATION WITH RAPID VENTRICULAR RESPONSE (HCC): ICD-10-CM

## 2017-06-19 PROCEDURE — 99214 OFFICE O/P EST MOD 30 MIN: CPT | Performed by: FAMILY MEDICINE

## 2017-06-19 NOTE — PROGRESS NOTES
CHIEF COMPLAINT: Patient presents with:  Spitting Up: blood in sputum     HPI:     Joanna Dukes is a 65year old male presents for evaluation of hemoptysis that started as once a month or less about 9 months ago in September 2016 while patient was only reconstruction for shoulder stabilization. Had chronic shoulder dislocations and history of humeral head fracture. OTHER SURGICAL HISTORY  8/28/2007    Comment L shoulder.  (Dx: impingment, AC arthopathy & degenerative labral tear) Arthroscopic acromiopl mg daily.  Take 1 tablet daily until instructed to reduce dose to 81 mg daily ) Disp: 90 tablet Rfl: 1       Allergies:  No Known Allergies    PSFH elements reviewed from today and agreed except as otherwise stated in HPI.  ROS:     Review of Systems  Posit (CPT=71275);  Future  - [facility] pulmonary referral  Continue carvedilol, amiodarone, pravastatin     Meds This Visit:    No prescriptions requested or ordered in this encounter    Health Maintenance:  Annual Physical due on 11/07/2017  LDL Control due on 1

## 2017-06-19 NOTE — PATIENT INSTRUCTIONS
Hemoptysis    Hemoptysis is the medical term for coughing up blood. There are many causes for this, including minor illnesses like bronchitis.  Hemoptysis can also be an early sign of a more serious illness, like a blood clot in the lung (pulmonary emboli © 3209-8320 [address], [address]. All rights reserved. This information is not intended as a substitute for professional medical care. Always follow your healthcare professional's instructions.

## 2017-06-20 ENCOUNTER — HOSPITAL ENCOUNTER (OUTPATIENT)
Dept: CT IMAGING | Facility: HOSPITAL | Age: 62
Discharge: HOME | End: 2017-06-20
Attending: FAMILY MEDICINE
Payer: COMMERCIAL

## 2017-06-20 ENCOUNTER — OFFICE VISIT (OUTPATIENT)
Dept: FAMILY MEDICINE CLINIC | Facility: CLINIC | Age: 62
End: 2017-06-20

## 2017-06-20 DIAGNOSIS — R04.2 HEMOPTYSIS: ICD-10-CM

## 2017-06-20 DIAGNOSIS — I25.10 CORONARY ARTERY DISEASE INVOLVING NATIVE CORONARY ARTERY OF NATIVE HEART WITHOUT ANGINA PECTORIS: ICD-10-CM

## 2017-06-20 DIAGNOSIS — R91.8 LUNG MASS: Primary | ICD-10-CM

## 2017-06-20 PROCEDURE — 71275 CT ANGIOGRAPHY CHEST: CPT | Performed by: FAMILY MEDICINE

## 2017-06-21 ENCOUNTER — OFFICE VISIT (OUTPATIENT)
Dept: HEMATOLOGY/ONCOLOGY | Facility: HOSPITAL | Age: 62
End: 2017-06-21
Attending: INTERNAL MEDICINE
Payer: COMMERCIAL

## 2017-06-21 VITALS
TEMPERATURE: 98 F | WEIGHT: 189.81 LBS | SYSTOLIC BLOOD PRESSURE: 126 MMHG | HEART RATE: 47 BPM | DIASTOLIC BLOOD PRESSURE: 66 MMHG | BODY MASS INDEX: 27.17 KG/M2 | RESPIRATION RATE: 18 BRPM | HEIGHT: 70 IN | OXYGEN SATURATION: 96 %

## 2017-06-21 DIAGNOSIS — R04.2 HEMOPTYSIS: ICD-10-CM

## 2017-06-21 DIAGNOSIS — I25.10 CORONARY ARTERY DISEASE INVOLVING NATIVE CORONARY ARTERY OF NATIVE HEART WITHOUT ANGINA PECTORIS: ICD-10-CM

## 2017-06-21 DIAGNOSIS — R16.0 LIVER MASSES: Primary | ICD-10-CM

## 2017-06-21 DIAGNOSIS — R91.8 LUNG MASS: ICD-10-CM

## 2017-06-21 DIAGNOSIS — I48.91 ATRIAL FIBRILLATION WITH RAPID VENTRICULAR RESPONSE (HCC): ICD-10-CM

## 2017-06-21 PROCEDURE — 99245 OFF/OP CONSLTJ NEW/EST HI 55: CPT | Performed by: INTERNAL MEDICINE

## 2017-06-21 NOTE — PROGRESS NOTES
Patient here for initial MD visit. Referred by Dr. Syeda Wolff. Was experiencing small amts blood when coughing. Had upper GI on 5/25 by [facility]. Stopped eliquis 6/14 per MD instructions.

## 2017-06-21 NOTE — PROGRESS NOTES
Hematology/Oncology Initial Consultation Note    Patient Name: Nicole Seay  Medical Record Number: [id number]    YOB: 1955   Date of Consultation: 6/21/2017   PCP: Dr. Jennifer Cobb  Other providers:  Dr. Luz Wesley (cardiology)    [place] and hasn't resumed. He had DAYANARA placed most recently in 2/2017 as below. Has been on dual antiplatelets with aspirin and plavix. Had also been on eliquis since 11/2016 for afib, but this was stopped last week on 6/14/17 for bleeding concern.  Prior to 11/ COLONOSCOPY      ANGIOPLASTY (CORONARY)  2/8/17    Comment 99% SVG to OM s/p 3.0x22 mm Resolute DAYANARA    BYPASS SURGERY  3/26/06    Comment CABG x3 LIMA to LAD, SVG to OM, SVG to PDA     Home Medications:    aspirin 81 MG Oral Tab Take 81 mg by mouth shruthi 1217)  BSA (Calculated - sq m): 2.04 sq meters (06/21 1217)  Pulse: 47 (06/21 1217)  BP: 126/66 mmHg (06/21 1217)  Temp: 98 °F (36.7 °C) (06/21 1217)  Do Not Use - Resp Rate: --  SpO2: 96 % (06/21 1217)    Wt Readings from Last 6 Encounters:  06/21/17 : 86 masses likely consistent with metastatic cancer  -case reviewed in our thoracic multidisciplinary tumor board today. Further I discussed his case with Dr. Philip Logan and Dr. Chelsie Del Cid.   He needs a biopsy to diagnose and appropriately stage, therefore liver lesion

## 2017-06-21 NOTE — PATIENT INSTRUCTIONS
For Dr. Ingrid Boss nurse line, call [phone] with any questions or concerns Monday through Friday 8:00 to 4:30. After hours or weekends for emergent needs [phone].

## 2017-06-22 ENCOUNTER — TELEPHONE (OUTPATIENT)
Dept: HEMATOLOGY/ONCOLOGY | Facility: HOSPITAL | Age: 62
End: 2017-06-22

## 2017-06-22 RX ORDER — PANTOPRAZOLE SODIUM 40 MG/1
40 TABLET, DELAYED RELEASE ORAL
COMMUNITY
End: 2018-01-01 | Stop reason: ALTCHOICE

## 2017-06-22 NOTE — TELEPHONE ENCOUNTER
Liver biopsy scheduled for 6/27 at 0930. They called and informed patient. Patient asking if should see a Pulmonologist or wait until after the bx results back. Per Dr. Thad Olsen, patient should establish with a Pulmonologist but can be after the biopsy.

## 2017-06-26 NOTE — IMAGING NOTE
Called the patient and left the pre procedure instructions regarding that he should be on NPOX6 hours prior,be at the hospital lnot later than 0830am,have a  to drive him back home post and the recovery is 3-4 hours postTo call [phone] for questi

## 2017-06-27 ENCOUNTER — HOSPITAL ENCOUNTER (OUTPATIENT)
Dept: ULTRASOUND IMAGING | Facility: HOSPITAL | Age: 62
Discharge: HOME | End: 2017-06-27
Attending: INTERNAL MEDICINE
Payer: COMMERCIAL

## 2017-06-27 ENCOUNTER — NURSE ONLY (OUTPATIENT)
Dept: LAB | Facility: HOSPITAL | Age: 62
End: 2017-06-27
Attending: INTERNAL MEDICINE
Payer: COMMERCIAL

## 2017-06-27 VITALS
OXYGEN SATURATION: 97 % | HEART RATE: 43 BPM | SYSTOLIC BLOOD PRESSURE: 102 MMHG | HEIGHT: 70 IN | TEMPERATURE: 98 F | DIASTOLIC BLOOD PRESSURE: 62 MMHG | BODY MASS INDEX: 27.06 KG/M2 | RESPIRATION RATE: 14 BRPM | WEIGHT: 189 LBS

## 2017-06-27 DIAGNOSIS — R16.0 LIVER MASS: ICD-10-CM

## 2017-06-27 DIAGNOSIS — R16.0 LIVER MASSES: ICD-10-CM

## 2017-06-27 DIAGNOSIS — R16.0 LIVER MASS: Primary | ICD-10-CM

## 2017-06-27 DIAGNOSIS — R91.8 LUNG MASS: ICD-10-CM

## 2017-06-27 LAB
INR BLD: 1.08 (ref 0.89–1.11)
PSA SERPL DL<=0.01 NG/ML-MCNC: 14 SECONDS (ref 12–14.3)

## 2017-06-27 PROCEDURE — 76942 ECHO GUIDE FOR BIOPSY: CPT | Performed by: INTERNAL MEDICINE

## 2017-06-27 PROCEDURE — 36415 COLL VENOUS BLD VENIPUNCTURE: CPT

## 2017-06-27 PROCEDURE — 85610 PROTHROMBIN TIME: CPT

## 2017-06-27 PROCEDURE — 99152 MOD SED SAME PHYS/QHP 5/>YRS: CPT | Performed by: INTERNAL MEDICINE

## 2017-06-27 PROCEDURE — 88313 SPECIAL STAINS GROUP 2: CPT | Performed by: INTERNAL MEDICINE

## 2017-06-27 PROCEDURE — 81210 BRAF GENE: CPT

## 2017-06-27 PROCEDURE — 88307 TISSUE EXAM BY PATHOLOGIST: CPT | Performed by: INTERNAL MEDICINE

## 2017-06-27 PROCEDURE — 88381 MICRODISSECTION MANUAL: CPT

## 2017-06-27 PROCEDURE — 88342 IMHCHEM/IMCYTCHM 1ST ANTB: CPT | Performed by: INTERNAL MEDICINE

## 2017-06-27 PROCEDURE — 81235 EGFR GENE COM VARIANTS: CPT

## 2017-06-27 PROCEDURE — 47000 NEEDLE BIOPSY OF LIVER PERQ: CPT | Performed by: INTERNAL MEDICINE

## 2017-06-27 PROCEDURE — 88342 IMHCHEM/IMCYTCHM 1ST ANTB: CPT

## 2017-06-27 PROCEDURE — 88341 IMHCHEM/IMCYTCHM EA ADD ANTB: CPT | Performed by: INTERNAL MEDICINE

## 2017-06-27 PROCEDURE — 88341 IMHCHEM/IMCYTCHM EA ADD ANTB: CPT

## 2017-06-27 PROCEDURE — 88360 TUMOR IMMUNOHISTOCHEM/MANUAL: CPT

## 2017-06-27 RX ORDER — MIDAZOLAM HYDROCHLORIDE 1 MG/ML
1 INJECTION INTRAMUSCULAR; INTRAVENOUS EVERY 5 MIN PRN
Status: DISCONTINUED | OUTPATIENT
Start: 2017-06-27 | End: 2017-07-01

## 2017-06-27 RX ORDER — SODIUM CHLORIDE 9 MG/ML
INJECTION, SOLUTION INTRAVENOUS CONTINUOUS
Status: DISCONTINUED | OUTPATIENT
Start: 2017-06-27 | End: 2017-07-01

## 2017-06-27 RX ORDER — MIDAZOLAM HYDROCHLORIDE 1 MG/ML
INJECTION INTRAMUSCULAR; INTRAVENOUS
Status: COMPLETED
Start: 2017-06-27 | End: 2017-06-27

## 2017-06-27 RX ORDER — FLUMAZENIL 0.1 MG/ML
INJECTION, SOLUTION INTRAVENOUS
Status: DISCONTINUED
Start: 2017-06-27 | End: 2017-06-27 | Stop reason: WASHOUT

## 2017-06-27 RX ORDER — FLUMAZENIL 0.1 MG/ML
0.2 INJECTION, SOLUTION INTRAVENOUS AS NEEDED
Status: DISCONTINUED | OUTPATIENT
Start: 2017-06-27 | End: 2017-07-01

## 2017-06-27 RX ORDER — NALOXONE HYDROCHLORIDE 0.4 MG/ML
INJECTION, SOLUTION INTRAMUSCULAR; INTRAVENOUS; SUBCUTANEOUS
Status: DISCONTINUED
Start: 2017-06-27 | End: 2017-06-27 | Stop reason: WASHOUT

## 2017-06-27 RX ORDER — NALOXONE HYDROCHLORIDE 0.4 MG/ML
80 INJECTION, SOLUTION INTRAMUSCULAR; INTRAVENOUS; SUBCUTANEOUS AS NEEDED
Status: DISCONTINUED | OUTPATIENT
Start: 2017-06-27 | End: 2017-07-01

## 2017-06-27 RX ADMIN — MIDAZOLAM HYDROCHLORIDE 1 MG: 1 INJECTION INTRAMUSCULAR; INTRAVENOUS at 09:44:00

## 2017-06-27 RX ADMIN — MIDAZOLAM HYDROCHLORIDE 0.5 MG: 1 INJECTION INTRAMUSCULAR; INTRAVENOUS at 09:48:00

## 2017-06-27 RX ADMIN — SODIUM CHLORIDE: 9 INJECTION, SOLUTION INTRAVENOUS at 09:42:00

## 2017-06-27 NOTE — H&P
[address] [facility] Patient Status:  Outpatient    1955 MRN [id number]   Location [address] [place] [address] Attending Marvin Sarabia MD   Hosp Day # 0 PCP Augustine Gordon DO     Admitting Diagnosis:   65 year-o Comment: L shoulder. (Dx: impingment, AC arthopathy &                degenerative labral tear) Arthroscopic                acromioplasty, distal clavicle resection and                debridement of labrum.    No date: SPINE SURGERY PROCEDURE UNLISTED      C input(s): GLU, BUN, CREATSERUM, GFRAA, GFRNAA, CA, NA, K, CL, CO2 in the last 72 hours.     Assessment/Plan:   Impression: 65year-old male with liver lesion    I have discussed with the patient and/or legal representative the potential benefits, risks, and

## 2017-06-27 NOTE — PROCEDURES
[facility]  Procedure Note    Camille Serrano Patient Status:  Outpatient    1955 MRN [id number]   Location [address] [place] [address] Attending Marielena Trotter MD   [address] [facility] [address] Day # 0 PCP Thierry Nixon DO     Procedure: Right lobe liver lesion bi

## 2017-06-27 NOTE — IMAGING NOTE
US guided liver lesion biopsy with Dr. Lv Vick. Pt tolerated well. Slight oozing noted to R flank biopsy site initially (pt was recently on Plavix). Upon transport to [facility], site intact. IV patent throughout procedure. Report to [facility].  Transport to [place]

## 2017-06-30 ENCOUNTER — TELEPHONE (OUTPATIENT)
Dept: HEMATOLOGY/ONCOLOGY | Facility: HOSPITAL | Age: 62
End: 2017-06-30

## 2017-06-30 DIAGNOSIS — C34.02 MALIGNANT NEOPLASM OF HILUS OF LEFT LUNG (HCC): Primary | ICD-10-CM

## 2017-06-30 DIAGNOSIS — C34.92 SQUAMOUS CELL LUNG CANCER, LEFT (HCC): ICD-10-CM

## 2017-06-30 DIAGNOSIS — C34.92 NON-SMALL CELL CARCINOMA OF LEFT LUNG METASTATIC TO ABDOMEN (HCC): Primary | ICD-10-CM

## 2017-06-30 DIAGNOSIS — C79.89 NON-SMALL CELL CARCINOMA OF LEFT LUNG METASTATIC TO ABDOMEN (HCC): Primary | ICD-10-CM

## 2017-06-30 PROBLEM — C34.91: Status: ACTIVE | Noted: 2017-06-30

## 2017-06-30 NOTE — PROGRESS NOTES
Late entry. Wife and patient came to discuss test results CT. discussed suspicious lesions for cancer in the chest with metastases to the liver. Appointment arranged the following day to see Dr. Lety Wheeler at 1 PM at [facility].   Answered questions

## 2017-06-30 NOTE — TELEPHONE ENCOUNTER
Derrill Cabot from IR asking if Dr. Grover Grijalva OK with stopping Plavix- stent was placed in feb. Clarified with Dr. Dalia Hernandez. MD had spoke with Dr. Grover Grijalva regarding holding plavix prior to his liver bx and she approved. Derrill Cabot aware of above.

## 2017-06-30 NOTE — TELEPHONE ENCOUNTER
Spoke with patient. CT scan and MRI scheduled for Monday 7/3 at 1:15pm in PL. Pt will  contrast that day at [facility] ([facility] OK per Dr. Zahida Tomlinson to have CT and MRI same day. Pt aware not to eat or drink anything other than contrast 2 hrs prior to scan.

## 2017-07-01 ENCOUNTER — HOSPITAL ENCOUNTER (OUTPATIENT)
Dept: RADIATION ONCOLOGY | Facility: HOSPITAL | Age: 62
End: 2017-07-01
Attending: RADIOLOGY
Payer: COMMERCIAL

## 2017-07-03 ENCOUNTER — HOSPITAL ENCOUNTER (OUTPATIENT)
Dept: CT IMAGING | Age: 62
Discharge: HOME | End: 2017-07-03
Attending: INTERNAL MEDICINE
Payer: COMMERCIAL

## 2017-07-03 ENCOUNTER — HOSPITAL ENCOUNTER (OUTPATIENT)
Dept: MRI IMAGING | Age: 62
Discharge: HOME | End: 2017-07-03
Attending: INTERNAL MEDICINE
Payer: COMMERCIAL

## 2017-07-03 DIAGNOSIS — C79.89 NON-SMALL CELL CARCINOMA OF LEFT LUNG METASTATIC TO ABDOMEN (HCC): ICD-10-CM

## 2017-07-03 DIAGNOSIS — C34.92 NON-SMALL CELL CARCINOMA OF LEFT LUNG METASTATIC TO ABDOMEN (HCC): ICD-10-CM

## 2017-07-03 PROCEDURE — 71260 CT THORAX DX C+: CPT | Performed by: INTERNAL MEDICINE

## 2017-07-03 PROCEDURE — 70553 MRI BRAIN STEM W/O & W/DYE: CPT | Performed by: INTERNAL MEDICINE

## 2017-07-03 PROCEDURE — A9575 INJ GADOTERATE MEGLUMI 0.1ML: HCPCS | Performed by: INTERNAL MEDICINE

## 2017-07-03 PROCEDURE — 74177 CT ABD & PELVIS W/CONTRAST: CPT | Performed by: INTERNAL MEDICINE

## 2017-07-05 ENCOUNTER — HOSPITAL ENCOUNTER (OUTPATIENT)
Dept: INTERVENTIONAL RADIOLOGY/VASCULAR | Facility: HOSPITAL | Age: 62
Discharge: HOME | End: 2017-07-05
Attending: INTERNAL MEDICINE | Admitting: INTERNAL MEDICINE
Payer: COMMERCIAL

## 2017-07-05 VITALS
HEART RATE: 43 BPM | WEIGHT: 189 LBS | DIASTOLIC BLOOD PRESSURE: 61 MMHG | RESPIRATION RATE: 15 BRPM | BODY MASS INDEX: 27.06 KG/M2 | SYSTOLIC BLOOD PRESSURE: 104 MMHG | HEIGHT: 70 IN | OXYGEN SATURATION: 95 %

## 2017-07-05 DIAGNOSIS — C34.92 NON-SMALL CELL CARCINOMA OF LEFT LUNG METASTATIC TO ABDOMEN (HCC): ICD-10-CM

## 2017-07-05 DIAGNOSIS — C79.89 NON-SMALL CELL CARCINOMA OF LEFT LUNG METASTATIC TO ABDOMEN (HCC): ICD-10-CM

## 2017-07-05 PROCEDURE — 02HV33Z INSERTION OF INFUSION DEVICE INTO SUPERIOR VENA CAVA, PERCUTANEOUS APPROACH: ICD-10-PCS | Performed by: RADIOLOGY

## 2017-07-05 PROCEDURE — 77001 FLUOROGUIDE FOR VEIN DEVICE: CPT

## 2017-07-05 PROCEDURE — 76937 US GUIDE VASCULAR ACCESS: CPT

## 2017-07-05 PROCEDURE — 36561 INSERT TUNNELED CV CATH: CPT

## 2017-07-05 PROCEDURE — 99153 MOD SED SAME PHYS/QHP EA: CPT

## 2017-07-05 PROCEDURE — 99152 MOD SED SAME PHYS/QHP 5/>YRS: CPT

## 2017-07-05 PROCEDURE — 0JH60WZ INSERTION OF TOTALLY IMPLANTABLE VASCULAR ACCESS DEVICE INTO CHEST SUBCUTANEOUS TISSUE AND FASCIA, OPEN APPROACH: ICD-10-PCS | Performed by: RADIOLOGY

## 2017-07-05 RX ORDER — HEPARIN SODIUM 5000 [USP'U]/ML
INJECTION, SOLUTION INTRAVENOUS; SUBCUTANEOUS
Status: COMPLETED
Start: 2017-07-05 | End: 2017-07-05

## 2017-07-05 RX ORDER — CEFAZOLIN SODIUM 1 G/3ML
INJECTION, POWDER, FOR SOLUTION INTRAMUSCULAR; INTRAVENOUS
Status: COMPLETED
Start: 2017-07-05 | End: 2017-07-05

## 2017-07-05 RX ORDER — SODIUM CHLORIDE 9 MG/ML
INJECTION, SOLUTION INTRAVENOUS CONTINUOUS
Status: DISCONTINUED | OUTPATIENT
Start: 2017-07-05 | End: 2017-07-07

## 2017-07-05 RX ORDER — BACITRACIN 50000 [USP'U]/1
INJECTION, POWDER, LYOPHILIZED, FOR SOLUTION INTRAMUSCULAR
Status: COMPLETED
Start: 2017-07-05 | End: 2017-07-05

## 2017-07-05 RX ORDER — LIDOCAINE HYDROCHLORIDE AND EPINEPHRINE 10; 10 MG/ML; UG/ML
INJECTION, SOLUTION INFILTRATION; PERINEURAL
Status: COMPLETED
Start: 2017-07-05 | End: 2017-07-05

## 2017-07-05 RX ORDER — LIDOCAINE HYDROCHLORIDE 10 MG/ML
INJECTION, SOLUTION INFILTRATION; PERINEURAL
Status: COMPLETED
Start: 2017-07-05 | End: 2017-07-05

## 2017-07-05 RX ORDER — MIDAZOLAM HYDROCHLORIDE 1 MG/ML
INJECTION INTRAMUSCULAR; INTRAVENOUS
Status: COMPLETED
Start: 2017-07-05 | End: 2017-07-05

## 2017-07-05 RX ADMIN — SODIUM CHLORIDE: 9 INJECTION, SOLUTION INTRAVENOUS at 07:45:00

## 2017-07-06 ENCOUNTER — APPOINTMENT (OUTPATIENT)
Dept: HEMATOLOGY/ONCOLOGY | Facility: HOSPITAL | Age: 62
End: 2017-07-06
Attending: INTERNAL MEDICINE
Payer: COMMERCIAL

## 2017-07-06 ENCOUNTER — APPOINTMENT (OUTPATIENT)
Dept: HEMATOLOGY/ONCOLOGY | Age: 62
End: 2017-07-06
Attending: INTERNAL MEDICINE
Payer: COMMERCIAL

## 2017-07-06 ENCOUNTER — HOSPITAL ENCOUNTER (OUTPATIENT)
Dept: RADIATION ONCOLOGY | Facility: HOSPITAL | Age: 62
Discharge: HOME | End: 2017-07-06
Attending: RADIOLOGY
Payer: COMMERCIAL

## 2017-07-06 VITALS
BODY MASS INDEX: 28.15 KG/M2 | OXYGEN SATURATION: 95 % | TEMPERATURE: 98 F | HEIGHT: 70 IN | WEIGHT: 196.63 LBS | SYSTOLIC BLOOD PRESSURE: 136 MMHG | RESPIRATION RATE: 15 BRPM | HEART RATE: 44 BPM | DIASTOLIC BLOOD PRESSURE: 75 MMHG

## 2017-07-06 DIAGNOSIS — C79.31 BRAIN METASTASIS (HCC): ICD-10-CM

## 2017-07-06 DIAGNOSIS — C79.31 BRAIN METASTASIS (HCC): Primary | ICD-10-CM

## 2017-07-06 DIAGNOSIS — C79.89 NON-SMALL CELL CARCINOMA OF LEFT LUNG METASTATIC TO ABDOMEN (HCC): Primary | ICD-10-CM

## 2017-07-06 DIAGNOSIS — C34.92 NON-SMALL CELL CARCINOMA OF LEFT LUNG METASTATIC TO ABDOMEN (HCC): Primary | ICD-10-CM

## 2017-07-06 PROCEDURE — 77001 FLUOROGUIDE FOR VEIN DEVICE: CPT

## 2017-07-06 PROCEDURE — 99153 MOD SED SAME PHYS/QHP EA: CPT

## 2017-07-06 PROCEDURE — 99152 MOD SED SAME PHYS/QHP 5/>YRS: CPT

## 2017-07-06 PROCEDURE — 76937 US GUIDE VASCULAR ACCESS: CPT

## 2017-07-06 PROCEDURE — 36561 INSERT TUNNELED CV CATH: CPT

## 2017-07-06 PROCEDURE — 99214 OFFICE O/P EST MOD 30 MIN: CPT

## 2017-07-06 PROCEDURE — 99215 OFFICE O/P EST HI 40 MIN: CPT | Performed by: INTERNAL MEDICINE

## 2017-07-06 RX ORDER — DEXAMETHASONE 4 MG/1
8 TABLET ORAL ONCE
Qty: 2 TABLET | Refills: 0 | Status: SHIPPED | OUTPATIENT
Start: 2017-07-06 | End: 2017-07-06

## 2017-07-06 RX ORDER — ALPRAZOLAM 0.25 MG/1
0.25 TABLET ORAL AS NEEDED
Qty: 5 TABLET | Refills: 0 | Status: SHIPPED | OUTPATIENT
Start: 2017-07-06 | End: 2017-07-25

## 2017-07-06 NOTE — PATIENT INSTRUCTIONS
For Dr. Mercedes Cao nurse line, call [phone] with any questions or concerns Monday through Friday 8:00 to 4:30. After hours or weekends for emergent needs [phone].

## 2017-07-06 NOTE — PROGRESS NOTES
Nursing Consultation Note  Patient: Claribel Carbajal  YOB: 1955  Age: 65year old  Radiation Oncologist: Dr. Crissie Hatchet  Referring Physician: Dr Abbey Ghosh metastasis [facility])  (primary encounter diagnosis)  Consult Date: 7/6/2 Comment: severe 3V native CAD w patent grafts  No date: CATH DRUG ELUTING STENT  5/13/2014: COLONOSCOPY      Comment: Procedure: COLONOSCOPY;  Surgeon: Sheyla Garcia MD;  Location: [address] [place] [address] ENDOSCOPY  1972: OTHER SURGICAL HISTORY      Comment for joint pain. Hx arthritis   Skin: Negative. Neurological: Negative. Endo/Heme/Allergies: Negative. On anticoag meds   Psychiatric/Behavioral: Negative. Vital Signs: There were no vitals taken for this visit.     Wt Readings from

## 2017-07-06 NOTE — PATIENT INSTRUCTIONS
- FOLLOW-UP WITH Dr Gita Villeda as planned    - CT SIMULATION SCHEDULED FOR tomorrow 7/7/17 @ 2:00 pm.      This is the first step in the radiation planning or \"mapping\" process    -Medications have been ordered by Dr Kash Bunn, including a sedative to take one ho

## 2017-07-06 NOTE — PROGRESS NOTES
[address] [facility] RADIATION ONCOLOGY CONSULTATION    PATIENT:   Gage Ponce      65year old      1/9/1955    REFERRING MD:  Dr. Chivo Ocasio    DIAGNOSIS:   Brain metastasis, with metastatic lung cancer        HPI:  60-year-old man here with his wif 27 years, lives in [place], they have an adult son, recently  lives in [place]    FH:   Mother had colon cancer    ROS:  Good appetite, no weight loss, no hemoptysis, no shortness of breath, no chest or abdominal pain, no headaches/weakness/numbness/f single lesion   Systemic therapy to follow   Decadron 8 mg x 1 on day of SRS   Xanax 0.25-0.5 mg for SRS   Will ask Dr. Manish Bey to co-manage    Thank you, Dr. Jaqui Mckeon, for including me in your patient’s care.     Galileo Marshall MD  Radiation Oncology

## 2017-07-06 NOTE — PROGRESS NOTES
Hematology/Oncology Clinic Follow Up Visit    Patient Name: Nicole Seay  Medical Record Number: [id number]    YOB: 1955    PCP: Dr. Jennifer Cobb  Other providers:  Dr. Luz Wesley (cardiology), Dr. Julio Hudson (rad onc)    Reason for Co Is feeling well, staying active. He has a dental procedure tomorrow, is waiting to resume his plavix until this is over. Remains on aspirin daily. Has been off eliquis since 6/14/17 given hemoptysis which he had been on for afib since 11/2016.   [facility] shoulder. (Dx: impingment, AC arthopathy &                degenerative labral tear) Arthroscopic                acromioplasty, distal clavicle resection and                debridement of labrum.    No date: SPINE SURGERY PROCEDURE UNLISTED      Comment: lum Heart Disorder Maternal Grandfather      stroke       Review of Systems:  A 10-point ROS was done with pertinent positives and negative per the HPI    Vital Signs:  Height: 177.8 cm (5' 10\") (07/06 1236)  Weight: 89.2 kg (196 lb 9.6 oz) (07/06 1236)  BSA -today I discussed his diagnosis, staging, prognosis, and treatment recommendations at length with . Christi Mena and his wife.  We discussed mariya unfortunately his has incurable disease, but that metastatic NSCLC can be treated with palliative chemotherapy once treatment is known. I spent 50 minutes face to face with the patient. More than 90% of that time was spent counseling the patient on the diagnosis, prognosis, and treatment options available and on coordination of care.      James Reyes MD  He

## 2017-07-06 NOTE — PROGRESS NOTES
Patient here to discuss results. States feeling well. PAC placed yesterday. CT and MRI 7/3. Liver bx 6/27. PAC dressing removed. Steri strips intact. Healing well, edges approximated. Minimal swelling, no discoloration.

## 2017-07-07 ENCOUNTER — HOSPITAL ENCOUNTER (OUTPATIENT)
Dept: RADIATION ONCOLOGY | Facility: HOSPITAL | Age: 62
Discharge: HOME | End: 2017-07-07
Attending: RADIOLOGY
Payer: COMMERCIAL

## 2017-07-07 PROBLEM — C79.31 BRAIN METASTASIS (HCC): Status: ACTIVE | Noted: 2017-07-07

## 2017-07-07 PROBLEM — C79.31 BRAIN METASTASIS: Status: ACTIVE | Noted: 2017-07-07

## 2017-07-07 LAB
ALK (D5F3) BY IHC RESULT: NEGATIVE
BRAF CODON 600 MUTATION DETECT: NOT DETECTED
EGFR BY PYROSEQUENCING RESULT: NOT DETECTED
ROS1 BY IHC RESULT: NEGATIVE

## 2017-07-07 PROCEDURE — 77290 THER RAD SIMULAJ FIELD CPLX: CPT | Performed by: RADIOLOGY

## 2017-07-07 PROCEDURE — 77470 SPECIAL RADIATION TREATMENT: CPT | Performed by: RADIOLOGY

## 2017-07-07 PROCEDURE — 77399 UNLISTED PX MED RADJ PHYSICS: CPT | Performed by: RADIOLOGY

## 2017-07-07 PROCEDURE — 77334 RADIATION TREATMENT AID(S): CPT | Performed by: RADIOLOGY

## 2017-07-10 LAB — ADEQUACY OF SPECIMEN: ADEQUATE

## 2017-07-11 ENCOUNTER — DIETICIAN VISIT (OUTPATIENT)
Dept: HEMATOLOGY/ONCOLOGY | Facility: HOSPITAL | Age: 62
End: 2017-07-11

## 2017-07-11 PROCEDURE — 77300 RADIATION THERAPY DOSE PLAN: CPT | Performed by: RADIOLOGY

## 2017-07-11 PROCEDURE — 77295 3-D RADIOTHERAPY PLAN: CPT | Performed by: RADIOLOGY

## 2017-07-11 PROCEDURE — 77334 RADIATION TREATMENT AID(S): CPT | Performed by: RADIOLOGY

## 2017-07-11 NOTE — PROGRESS NOTES
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 Chart reviewed. Pt appears at a mild to moderate nutrition risk at present. RD available on consult.

## 2017-07-13 ENCOUNTER — TELEPHONE (OUTPATIENT)
Dept: HEMATOLOGY/ONCOLOGY | Facility: HOSPITAL | Age: 62
End: 2017-07-13

## 2017-07-13 DIAGNOSIS — C34.92 NON-SMALL CELL CARCINOMA OF LEFT LUNG METASTATIC TO ABDOMEN (HCC): Primary | ICD-10-CM

## 2017-07-13 DIAGNOSIS — C79.89 NON-SMALL CELL CARCINOMA OF LEFT LUNG METASTATIC TO ABDOMEN (HCC): Primary | ICD-10-CM

## 2017-07-13 RX ORDER — PROCHLORPERAZINE MALEATE 10 MG
10 TABLET ORAL EVERY 6 HOURS PRN
Qty: 30 TABLET | Refills: 3 | Status: SHIPPED | OUTPATIENT
Start: 2017-07-13 | End: 2018-01-01 | Stop reason: ALTCHOICE

## 2017-07-13 RX ORDER — ONDANSETRON HYDROCHLORIDE 8 MG/1
8 TABLET, FILM COATED ORAL EVERY 8 HOURS PRN
Qty: 30 TABLET | Refills: 3 | Status: SHIPPED | OUTPATIENT
Start: 2017-07-13 | End: 2018-01-01 | Stop reason: ALTCHOICE

## 2017-07-14 ENCOUNTER — MEDICAL CORRESPONDENCE (OUTPATIENT)
Dept: RADIATION ONCOLOGY | Facility: HOSPITAL | Age: 62
End: 2017-07-14

## 2017-07-14 ENCOUNTER — HOSPITAL ENCOUNTER (OUTPATIENT)
Dept: RADIATION ONCOLOGY | Facility: HOSPITAL | Age: 62
Discharge: HOME | End: 2017-07-14
Attending: RADIOLOGY
Payer: COMMERCIAL

## 2017-07-14 ENCOUNTER — OFFICE VISIT (OUTPATIENT)
Dept: HEMATOLOGY/ONCOLOGY | Facility: HOSPITAL | Age: 62
End: 2017-07-14
Attending: INTERNAL MEDICINE
Payer: COMMERCIAL

## 2017-07-14 DIAGNOSIS — Z71.9 ENCOUNTER FOR EDUCATION: Primary | ICD-10-CM

## 2017-07-14 DIAGNOSIS — C79.89 NON-SMALL CELL CARCINOMA OF LEFT LUNG METASTATIC TO ABDOMEN (HCC): ICD-10-CM

## 2017-07-14 DIAGNOSIS — C34.92 NON-SMALL CELL CARCINOMA OF LEFT LUNG METASTATIC TO ABDOMEN (HCC): ICD-10-CM

## 2017-07-14 PROCEDURE — 99214 OFFICE O/P EST MOD 30 MIN: CPT | Performed by: NURSE PRACTITIONER

## 2017-07-14 PROCEDURE — 77280 THER RAD SIMULAJ FIELD SMPL: CPT | Performed by: RADIOLOGY

## 2017-07-14 PROCEDURE — 77372 SRS LINEAR BASED: CPT | Performed by: RADIOLOGY

## 2017-07-15 NOTE — PROGRESS NOTES
Chemotherapy Education    Patient: Cristiana Zhou  Date: 7/14/17  Barriers / Limitations:  None  Diagnosis: lung cancer  Caregivers present: spouse    Drug names: Cisplatin, Gemzar, Necitumumab  Day 1 cisplatin and gemzar  Day 8 gemzar  Every 21 days    E pregnancy / use of barrier birth control methods:   Possible sterility, impotence, changes in sex drive:   Menstrual irregularity and vaginal dryness  Comments.     Vesicants / Irritants:  Potential extravasation at site of administration:  Achieved  Signs

## 2017-07-18 ENCOUNTER — HOSPITAL ENCOUNTER (OUTPATIENT)
Dept: RADIATION ONCOLOGY | Facility: HOSPITAL | Age: 62
Discharge: HOME | End: 2017-07-18
Attending: RADIOLOGY
Payer: COMMERCIAL

## 2017-07-18 ENCOUNTER — OFFICE VISIT (OUTPATIENT)
Dept: HEMATOLOGY/ONCOLOGY | Facility: HOSPITAL | Age: 62
End: 2017-07-18
Attending: INTERNAL MEDICINE
Payer: COMMERCIAL

## 2017-07-18 VITALS
DIASTOLIC BLOOD PRESSURE: 71 MMHG | HEIGHT: 66.22 IN | RESPIRATION RATE: 16 BRPM | BODY MASS INDEX: 31.05 KG/M2 | HEART RATE: 50 BPM | WEIGHT: 193.19 LBS | TEMPERATURE: 97 F | OXYGEN SATURATION: 96 % | SYSTOLIC BLOOD PRESSURE: 122 MMHG

## 2017-07-18 VITALS — TEMPERATURE: 98 F | SYSTOLIC BLOOD PRESSURE: 112 MMHG | HEART RATE: 68 BPM | DIASTOLIC BLOOD PRESSURE: 70 MMHG

## 2017-07-18 DIAGNOSIS — C34.92 NON-SMALL CELL CARCINOMA OF LEFT LUNG METASTATIC TO ABDOMEN (HCC): Primary | ICD-10-CM

## 2017-07-18 DIAGNOSIS — C79.31 BRAIN METASTASIS (HCC): Primary | ICD-10-CM

## 2017-07-18 DIAGNOSIS — C79.89 NON-SMALL CELL CARCINOMA OF LEFT LUNG METASTATIC TO ABDOMEN (HCC): Primary | ICD-10-CM

## 2017-07-18 LAB
ALBUMIN SERPL-MCNC: 3.4 G/DL (ref 3.5–4.8)
ALP LIVER SERPL-CCNC: 79 U/L (ref 45–117)
ALT SERPL-CCNC: 20 U/L (ref 17–63)
AST SERPL-CCNC: 19 U/L (ref 15–41)
BASOPHILS # BLD AUTO: 0.07 X10(3) UL (ref 0–0.1)
BASOPHILS NFR BLD AUTO: 0.7 %
BILIRUB SERPL-MCNC: 0.4 MG/DL (ref 0.1–2)
BUN BLD-MCNC: 16 MG/DL (ref 8–20)
CALCIUM BLD-MCNC: 8.9 MG/DL (ref 8.3–10.3)
CHLORIDE: 105 MMOL/L (ref 101–111)
CO2: 25 MMOL/L (ref 22–32)
CREAT BLD-MCNC: 0.88 MG/DL (ref 0.7–1.3)
EOSINOPHIL # BLD AUTO: 0.32 X10(3) UL (ref 0–0.3)
EOSINOPHIL NFR BLD AUTO: 3.2 %
ERYTHROCYTE [DISTWIDTH] IN BLOOD BY AUTOMATED COUNT: 12.7 % (ref 11.5–16)
GLUCOSE BLD-MCNC: 77 MG/DL (ref 70–99)
HCT VFR BLD AUTO: 43.3 % (ref 37–53)
HGB BLD-MCNC: 14.1 G/DL (ref 13–17)
IMMATURE GRANULOCYTE COUNT: 0.03 X10(3) UL (ref 0–1)
IMMATURE GRANULOCYTE RATIO %: 0.3 %
LYMPHOCYTES # BLD AUTO: 1.74 X10(3) UL (ref 0.9–4)
LYMPHOCYTES NFR BLD AUTO: 17.7 %
M PROTEIN MFR SERPL ELPH: 6.9 G/DL (ref 6.1–8.3)
MCH RBC QN AUTO: 29.7 PG (ref 27–33.2)
MCHC RBC AUTO-ENTMCNC: 32.6 G/DL (ref 31–37)
MCV RBC AUTO: 91.4 FL (ref 80–99)
MONOCYTES # BLD AUTO: 0.69 X10(3) UL (ref 0.1–0.6)
MONOCYTES NFR BLD AUTO: 7 %
NEUTROPHIL ABS PRELIM: 7 X10 (3) UL (ref 1.3–6.7)
NEUTROPHILS # BLD AUTO: 7 X10(3) UL (ref 1.3–6.7)
NEUTROPHILS NFR BLD AUTO: 71.1 %
PLATELET # BLD AUTO: 233 10(3)UL (ref 150–450)
POTASSIUM SERPL-SCNC: 4.5 MMOL/L (ref 3.6–5.1)
RBC # BLD AUTO: 4.74 X10(6)UL (ref 4.3–5.7)
RED CELL DISTRIBUTION WIDTH-SD: 42.2 FL (ref 35.1–46.3)
SODIUM SERPL-SCNC: 138 MMOL/L (ref 136–144)
WBC # BLD AUTO: 9.9 X10(3) UL (ref 4–13)

## 2017-07-18 PROCEDURE — 80053 COMPREHEN METABOLIC PANEL: CPT

## 2017-07-18 PROCEDURE — 85025 COMPLETE CBC W/AUTO DIFF WBC: CPT

## 2017-07-18 PROCEDURE — 96366 THER/PROPH/DIAG IV INF ADDON: CPT

## 2017-07-18 PROCEDURE — 96376 TX/PRO/DX INJ SAME DRUG ADON: CPT

## 2017-07-18 PROCEDURE — 96413 CHEMO IV INFUSION 1 HR: CPT

## 2017-07-18 PROCEDURE — 96367 TX/PROPH/DG ADDL SEQ IV INF: CPT

## 2017-07-18 PROCEDURE — 96375 TX/PRO/DX INJ NEW DRUG ADDON: CPT

## 2017-07-18 PROCEDURE — 96417 CHEMO IV INFUS EACH ADDL SEQ: CPT

## 2017-07-18 RX ORDER — METOCLOPRAMIDE HYDROCHLORIDE 5 MG/ML
10 INJECTION INTRAMUSCULAR; INTRAVENOUS EVERY 6 HOURS PRN
Status: CANCELLED | OUTPATIENT
Start: 2017-07-27

## 2017-07-18 RX ORDER — LORAZEPAM 2 MG/ML
INJECTION INTRAMUSCULAR EVERY 4 HOURS PRN
Status: CANCELLED | OUTPATIENT
Start: 2017-07-27

## 2017-07-18 RX ORDER — LORAZEPAM 0.5 MG/1
TABLET ORAL EVERY 4 HOURS PRN
Status: CANCELLED | OUTPATIENT
Start: 2017-07-27

## 2017-07-18 RX ORDER — PROCHLORPERAZINE MALEATE 10 MG
10 TABLET ORAL EVERY 6 HOURS PRN
Status: CANCELLED | OUTPATIENT
Start: 2017-07-18

## 2017-07-18 RX ORDER — LORAZEPAM 0.5 MG/1
TABLET ORAL EVERY 4 HOURS PRN
Status: CANCELLED | OUTPATIENT
Start: 2017-07-18

## 2017-07-18 RX ORDER — PROCHLORPERAZINE MALEATE 10 MG
10 TABLET ORAL EVERY 6 HOURS PRN
Status: CANCELLED | OUTPATIENT
Start: 2017-07-27

## 2017-07-18 RX ORDER — SODIUM CHLORIDE 9 MG/ML
1000 INJECTION, SOLUTION INTRAVENOUS ONCE
Status: COMPLETED | OUTPATIENT
Start: 2017-07-18 | End: 2017-07-18

## 2017-07-18 RX ORDER — ONDANSETRON 2 MG/ML
8 INJECTION INTRAMUSCULAR; INTRAVENOUS EVERY 6 HOURS PRN
Status: CANCELLED | OUTPATIENT
Start: 2017-07-27

## 2017-07-18 RX ORDER — METOCLOPRAMIDE HYDROCHLORIDE 5 MG/ML
10 INJECTION INTRAMUSCULAR; INTRAVENOUS EVERY 6 HOURS PRN
Status: CANCELLED | OUTPATIENT
Start: 2017-07-18

## 2017-07-18 RX ORDER — ONDANSETRON 2 MG/ML
8 INJECTION INTRAMUSCULAR; INTRAVENOUS EVERY 6 HOURS PRN
Status: CANCELLED | OUTPATIENT
Start: 2017-07-18

## 2017-07-18 RX ORDER — LORAZEPAM 2 MG/ML
INJECTION INTRAMUSCULAR EVERY 4 HOURS PRN
Status: CANCELLED | OUTPATIENT
Start: 2017-07-18

## 2017-07-18 RX ADMIN — SODIUM CHLORIDE 1000 ML: 9 INJECTION, SOLUTION INTRAVENOUS at 13:40:00

## 2017-07-18 NOTE — PATIENT INSTRUCTIONS
POST-RADIATION INSTRUCTIONS:   - CALL [phone] FOR A FOLLOW-UP WITH DR. Maxine Jacob in 3 months after MRI  -Call Central Scheduling [phone] to schedule your MRI for October 2017  - Bridgette Rangel as scheduled  - SIDE EFFECTS OF RADIATION [place]

## 2017-07-18 NOTE — PROGRESS NOTES
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

## 2017-07-18 NOTE — PROGRESS NOTES
[facility] Radiation Treatment Management Note 1-5    Patient:  Cristiana Zhou  Age:  65year old  Visit Diagnosis:    1.  Brain metastasis ([address]      Primary Rad/Onc:  Dr. Jenni Sanders    Site Delivered Dose (Gy) Prescribed Dose (Gy

## 2017-07-20 ENCOUNTER — TELEPHONE (OUTPATIENT)
Dept: HEMATOLOGY/ONCOLOGY | Facility: HOSPITAL | Age: 62
End: 2017-07-20

## 2017-07-20 NOTE — TELEPHONE ENCOUNTER
Date of Treatment: 7/18/17                                Type of Chemo: Cisplatin/Gemzar    Comments: \" I am feeling the typical side effects from the treatment. \" He states he is having some nausea and lower appetite.  He is still however, able to eat no

## 2017-07-25 ENCOUNTER — OFFICE VISIT (OUTPATIENT)
Dept: HEMATOLOGY/ONCOLOGY | Age: 62
End: 2017-07-25
Attending: INTERNAL MEDICINE
Payer: COMMERCIAL

## 2017-07-25 VITALS
DIASTOLIC BLOOD PRESSURE: 73 MMHG | HEART RATE: 49 BPM | WEIGHT: 193.69 LBS | OXYGEN SATURATION: 98 % | TEMPERATURE: 98 F | BODY MASS INDEX: 31 KG/M2 | RESPIRATION RATE: 16 BRPM | SYSTOLIC BLOOD PRESSURE: 116 MMHG

## 2017-07-25 DIAGNOSIS — K59.00 CONSTIPATION, UNSPECIFIED: ICD-10-CM

## 2017-07-25 DIAGNOSIS — C79.89 NON-SMALL CELL CARCINOMA OF LEFT LUNG METASTATIC TO ABDOMEN (HCC): Primary | ICD-10-CM

## 2017-07-25 DIAGNOSIS — T45.1X5A CHEMOTHERAPY-INDUCED NAUSEA: ICD-10-CM

## 2017-07-25 DIAGNOSIS — M54.50 CHRONIC BILATERAL LOW BACK PAIN WITHOUT SCIATICA: ICD-10-CM

## 2017-07-25 DIAGNOSIS — C79.31 BRAIN METASTASIS (HCC): ICD-10-CM

## 2017-07-25 DIAGNOSIS — C34.92 NON-SMALL CELL CARCINOMA OF LEFT LUNG METASTATIC TO ABDOMEN (HCC): Primary | ICD-10-CM

## 2017-07-25 DIAGNOSIS — R11.0 CHEMOTHERAPY-INDUCED NAUSEA: ICD-10-CM

## 2017-07-25 DIAGNOSIS — G89.29 CHRONIC BILATERAL LOW BACK PAIN WITHOUT SCIATICA: ICD-10-CM

## 2017-07-25 LAB
BASOPHILS # BLD AUTO: 0.03 X10(3) UL (ref 0–0.1)
BASOPHILS NFR BLD AUTO: 0.7 %
EOSINOPHIL # BLD AUTO: 0.1 X10(3) UL (ref 0–0.3)
EOSINOPHIL NFR BLD AUTO: 2.2 %
ERYTHROCYTE [DISTWIDTH] IN BLOOD BY AUTOMATED COUNT: 12.3 % (ref 11.5–16)
HCT VFR BLD AUTO: 40.4 % (ref 37–53)
HGB BLD-MCNC: 13.7 G/DL (ref 13–17)
IMMATURE GRANULOCYTE COUNT: 0.03 X10(3) UL (ref 0–1)
IMMATURE GRANULOCYTE RATIO %: 0.7 %
LYMPHOCYTES # BLD AUTO: 1.83 X10(3) UL (ref 0.9–4)
LYMPHOCYTES NFR BLD AUTO: 39.8 %
MCH RBC QN AUTO: 30.6 PG (ref 27–33.2)
MCHC RBC AUTO-ENTMCNC: 33.9 G/DL (ref 31–37)
MCV RBC AUTO: 90.4 FL (ref 80–99)
MONOCYTES # BLD AUTO: 0.15 X10(3) UL (ref 0.1–0.6)
MONOCYTES NFR BLD AUTO: 3.3 %
NEUTROPHIL ABS PRELIM: 2.46 X10 (3) UL (ref 1.3–6.7)
NEUTROPHILS # BLD AUTO: 2.46 X10(3) UL (ref 1.3–6.7)
NEUTROPHILS NFR BLD AUTO: 53.3 %
PLATELET # BLD AUTO: 170 10(3)UL (ref 150–450)
RBC # BLD AUTO: 4.47 X10(6)UL (ref 4.3–5.7)
RED CELL DISTRIBUTION WIDTH-SD: 41.2 FL (ref 35.1–46.3)
WBC # BLD AUTO: 4.6 X10(3) UL (ref 4–13)

## 2017-07-25 PROCEDURE — 96375 TX/PRO/DX INJ NEW DRUG ADDON: CPT

## 2017-07-25 PROCEDURE — 99215 OFFICE O/P EST HI 40 MIN: CPT | Performed by: INTERNAL MEDICINE

## 2017-07-25 PROCEDURE — 85025 COMPLETE CBC W/AUTO DIFF WBC: CPT

## 2017-07-25 PROCEDURE — 96413 CHEMO IV INFUSION 1 HR: CPT

## 2017-07-25 NOTE — PATIENT INSTRUCTIONS
Try taking senna (sennosides) and docusate - both twice daily to better prevent and control constipation. If you start having frequent loose stools with this, stop the senna and just take the docusate.

## 2017-07-25 NOTE — PROGRESS NOTES
Hematology/Oncology Clinic Follow Up Visit    Patient Name: Gage Ponce  Medical Record Number: [id number]    YOB: 1955    PCP: Dr. Esmer Butler  Other providers:  Dr. Karla Christianson (cardiology), Dr. Christiano Flores (rad onc)    Reason for Co for follow up and for C1D8 chemotherapy. He reports tolerating chemo sufficiently well over the last week. His appetite is down, but is forcing himself to eat.   Has chronic hip and back pains which became worse last week, having a harder time walking his Procedure: COLONOSCOPY;  Surgeon: Kayla Méndez MD;  Location: [address] [place] [address] ENDOSCOPY  1972: OTHER SURGICAL HISTORY      Comment: RT shoulder reconstruction for shoulder                stabilization.  Had chronic shoulder                dislocatio Drug use: No    Sexual activity: Not on file     Other Topics Concern    Caffeine Concern Yes    Comment: 3-4cups/daily coffee    Exercise Yes    Comment: walks 2-3 miles a day    Seat Belt Yes     Social History Narrative    , has one adult son 07/25/2017   RDW 12.3 07/25/2017   .0 07/25/2017   .0 07/18/2017   .0 06/21/2017       Lab Results  Component Value Date   GLU 77 07/18/2017   BUN 16 07/18/2017   CREATSERUM 0.88 07/18/2017   CREATSERUM 1.03 06/21/2017   CREATSERUM 0.9 use  -start senna and docusate    *nausea- chemotherapy induced  -has been mild/well controlled  -continue prn zofran or compazine    *acute on chronic back/hip pain  -offered PT referral and pain meds, but pt declined at this time  -encouraged him to cont

## 2017-07-25 NOTE — PROGRESS NOTES
Pt here for MD f/u visit, h/o lung ca. Due for C1D8 Gemzar. Pt c/o hip and back pain for several days after treatment. He does do cardio and walk several miles per day and felt this was more difficult.  He also c/o severe constipation on Sunday- he did not

## 2017-07-25 NOTE — PATIENT INSTRUCTIONS
To reach Dr Gee Baltazar triage nurse during business hours, please call [phone]. After hours, including weekends, evenings, and holidays, please call the main number [phone] for emergent needs.

## 2017-07-25 NOTE — PROGRESS NOTES
Education Record    Learner:  Patient    Disease / [facility] cancer    Barriers / Limitations:  None    Method:  Brief focused, printed material and  reinforcement    General Topics:  Plan of care reviewed    Outcome:  Shows understanding

## 2017-07-27 NOTE — PROGRESS NOTES
[facility] RADIATION ONCOLOGY TREATMENT SUMMARY    PATIENT:  Triston Perez    REFERRING MD: Dr. Guerline Baires    DIAGNOSIS:  Brain metastasis    CANCER HISTORY:  50-year-old man presented with hemoptysis but no weight loss or thoracic symptoms.

## 2017-07-28 PROBLEM — R11.0 CHEMOTHERAPY-INDUCED NAUSEA: Status: ACTIVE | Noted: 2017-07-28

## 2017-07-28 PROBLEM — M54.50 CHRONIC BILATERAL LOW BACK PAIN WITHOUT SCIATICA: Status: ACTIVE | Noted: 2017-07-28

## 2017-07-28 PROBLEM — T45.1X5A CHEMOTHERAPY-INDUCED NAUSEA: Status: ACTIVE | Noted: 2017-07-28

## 2017-07-28 PROBLEM — K59.00 CONSTIPATION, UNSPECIFIED: Status: ACTIVE | Noted: 2017-07-28

## 2017-07-28 PROBLEM — G89.29 CHRONIC BILATERAL LOW BACK PAIN WITHOUT SCIATICA: Status: ACTIVE | Noted: 2017-07-28

## 2017-07-28 PROBLEM — R91.8 LUNG MASS: Status: RESOLVED | Noted: 2017-06-21 | Resolved: 2017-07-28

## 2017-07-28 PROBLEM — R16.0 LIVER MASSES: Status: RESOLVED | Noted: 2017-06-21 | Resolved: 2017-07-28

## 2017-08-07 ENCOUNTER — APPOINTMENT (OUTPATIENT)
Dept: HEMATOLOGY/ONCOLOGY | Facility: HOSPITAL | Age: 62
End: 2017-08-07
Attending: INTERNAL MEDICINE
Payer: COMMERCIAL

## 2017-08-07 VITALS
DIASTOLIC BLOOD PRESSURE: 62 MMHG | SYSTOLIC BLOOD PRESSURE: 105 MMHG | HEART RATE: 48 BPM | HEIGHT: 66.22 IN | OXYGEN SATURATION: 98 % | TEMPERATURE: 98 F | RESPIRATION RATE: 17 BRPM | BODY MASS INDEX: 32.3 KG/M2 | WEIGHT: 201 LBS

## 2017-08-07 DIAGNOSIS — R11.0 CHEMOTHERAPY-INDUCED NAUSEA: ICD-10-CM

## 2017-08-07 DIAGNOSIS — G89.29 CHRONIC BILATERAL LOW BACK PAIN WITHOUT SCIATICA: ICD-10-CM

## 2017-08-07 DIAGNOSIS — C34.92 NON-SMALL CELL CARCINOMA OF LEFT LUNG METASTATIC TO ABDOMEN (HCC): Primary | ICD-10-CM

## 2017-08-07 DIAGNOSIS — C79.89 NON-SMALL CELL CARCINOMA OF LEFT LUNG METASTATIC TO ABDOMEN (HCC): Primary | ICD-10-CM

## 2017-08-07 DIAGNOSIS — M54.50 CHRONIC BILATERAL LOW BACK PAIN WITHOUT SCIATICA: ICD-10-CM

## 2017-08-07 DIAGNOSIS — T45.1X5A CHEMOTHERAPY-INDUCED NAUSEA: ICD-10-CM

## 2017-08-07 DIAGNOSIS — K59.00 CONSTIPATION, UNSPECIFIED: ICD-10-CM

## 2017-08-07 DIAGNOSIS — C79.31 BRAIN METASTASIS (HCC): ICD-10-CM

## 2017-08-07 LAB
ALBUMIN SERPL-MCNC: 3.5 G/DL (ref 3.5–4.8)
ALP LIVER SERPL-CCNC: 58 U/L (ref 45–117)
ALT SERPL-CCNC: 26 U/L (ref 17–63)
AST SERPL-CCNC: 22 U/L (ref 15–41)
BASOPHILS # BLD AUTO: 0.02 X10(3) UL (ref 0–0.1)
BASOPHILS NFR BLD AUTO: 0.5 %
BILIRUB SERPL-MCNC: 0.3 MG/DL (ref 0.1–2)
BUN BLD-MCNC: 12 MG/DL (ref 8–20)
CALCIUM BLD-MCNC: 8.7 MG/DL (ref 8.3–10.3)
CHLORIDE: 107 MMOL/L (ref 101–111)
CO2: 25 MMOL/L (ref 22–32)
CREAT BLD-MCNC: 1.03 MG/DL (ref 0.7–1.3)
EOSINOPHIL # BLD AUTO: 0.09 X10(3) UL (ref 0–0.3)
EOSINOPHIL NFR BLD AUTO: 2.1 %
ERYTHROCYTE [DISTWIDTH] IN BLOOD BY AUTOMATED COUNT: 13.3 % (ref 11.5–16)
GLUCOSE BLD-MCNC: 101 MG/DL (ref 70–99)
HAV IGM SER QL: 2.1 MG/DL (ref 1.7–3)
HCT VFR BLD AUTO: 39 % (ref 37–53)
HGB BLD-MCNC: 13 G/DL (ref 13–17)
IMMATURE GRANULOCYTE COUNT: 0.03 X10(3) UL (ref 0–1)
IMMATURE GRANULOCYTE RATIO %: 0.7 %
LYMPHOCYTES # BLD AUTO: 1.64 X10(3) UL (ref 0.9–4)
LYMPHOCYTES NFR BLD AUTO: 38.3 %
M PROTEIN MFR SERPL ELPH: 6.7 G/DL (ref 6.1–8.3)
MCH RBC QN AUTO: 30.4 PG (ref 27–33.2)
MCHC RBC AUTO-ENTMCNC: 33.3 G/DL (ref 31–37)
MCV RBC AUTO: 91.1 FL (ref 80–99)
MONOCYTES # BLD AUTO: 0.57 X10(3) UL (ref 0.1–0.6)
MONOCYTES NFR BLD AUTO: 13.3 %
NEUTROPHIL ABS PRELIM: 1.93 X10 (3) UL (ref 1.3–6.7)
NEUTROPHILS # BLD AUTO: 1.93 X10(3) UL (ref 1.3–6.7)
NEUTROPHILS NFR BLD AUTO: 45.1 %
PLATELET # BLD AUTO: 414 10(3)UL (ref 150–450)
POTASSIUM SERPL-SCNC: 3.9 MMOL/L (ref 3.6–5.1)
RBC # BLD AUTO: 4.28 X10(6)UL (ref 4.3–5.7)
RED CELL DISTRIBUTION WIDTH-SD: 42 FL (ref 35.1–46.3)
SODIUM SERPL-SCNC: 141 MMOL/L (ref 136–144)
WBC # BLD AUTO: 4.3 X10(3) UL (ref 4–13)

## 2017-08-07 PROCEDURE — 85025 COMPLETE CBC W/AUTO DIFF WBC: CPT

## 2017-08-07 PROCEDURE — 80053 COMPREHEN METABOLIC PANEL: CPT

## 2017-08-07 PROCEDURE — 83735 ASSAY OF MAGNESIUM: CPT

## 2017-08-07 PROCEDURE — 96375 TX/PRO/DX INJ NEW DRUG ADDON: CPT

## 2017-08-07 PROCEDURE — 96417 CHEMO IV INFUS EACH ADDL SEQ: CPT

## 2017-08-07 PROCEDURE — 99215 OFFICE O/P EST HI 40 MIN: CPT | Performed by: INTERNAL MEDICINE

## 2017-08-07 PROCEDURE — 96413 CHEMO IV INFUSION 1 HR: CPT

## 2017-08-07 RX ORDER — ACETAMINOPHEN 325 MG/1
TABLET ORAL EVERY 6 HOURS PRN
Status: CANCELLED | OUTPATIENT
Start: 2017-08-15

## 2017-08-07 RX ORDER — ALBUTEROL SULFATE 90 UG/1
2 AEROSOL, METERED RESPIRATORY (INHALATION) AS NEEDED
Status: CANCELLED | OUTPATIENT
Start: 2017-08-07

## 2017-08-07 RX ORDER — MEPERIDINE HYDROCHLORIDE 25 MG/ML
INJECTION INTRAMUSCULAR; INTRAVENOUS; SUBCUTANEOUS AS NEEDED
Status: CANCELLED | OUTPATIENT
Start: 2017-08-07

## 2017-08-07 RX ORDER — PROCHLORPERAZINE MALEATE 10 MG
10 TABLET ORAL EVERY 6 HOURS PRN
Status: CANCELLED | OUTPATIENT
Start: 2017-08-07

## 2017-08-07 RX ORDER — MEPERIDINE HYDROCHLORIDE 25 MG/ML
INJECTION INTRAMUSCULAR; INTRAVENOUS; SUBCUTANEOUS AS NEEDED
Status: CANCELLED | OUTPATIENT
Start: 2017-08-15

## 2017-08-07 RX ORDER — ALBUTEROL SULFATE 90 UG/1
2 AEROSOL, METERED RESPIRATORY (INHALATION) AS NEEDED
Status: CANCELLED | OUTPATIENT
Start: 2017-08-15

## 2017-08-07 RX ORDER — DIPHENHYDRAMINE HYDROCHLORIDE 50 MG/ML
INJECTION INTRAMUSCULAR; INTRAVENOUS EVERY 4 HOURS PRN
Status: CANCELLED | OUTPATIENT
Start: 2017-08-15

## 2017-08-07 RX ORDER — RANITIDINE 25 MG/ML
50 INJECTION, SOLUTION INTRAMUSCULAR; INTRAVENOUS AS NEEDED
Status: CANCELLED | OUTPATIENT
Start: 2017-08-07

## 2017-08-07 RX ORDER — LORAZEPAM 2 MG/ML
INJECTION INTRAMUSCULAR EVERY 4 HOURS PRN
Status: CANCELLED | OUTPATIENT
Start: 2017-08-15

## 2017-08-07 RX ORDER — RANITIDINE 25 MG/ML
50 INJECTION, SOLUTION INTRAMUSCULAR; INTRAVENOUS AS NEEDED
Status: CANCELLED | OUTPATIENT
Start: 2017-08-15

## 2017-08-07 RX ORDER — LORAZEPAM 0.5 MG/1
TABLET ORAL EVERY 4 HOURS PRN
Status: CANCELLED | OUTPATIENT
Start: 2017-08-15

## 2017-08-07 RX ORDER — SODIUM CHLORIDE 9 MG/ML
1000 INJECTION, SOLUTION INTRAVENOUS ONCE
Status: COMPLETED | OUTPATIENT
Start: 2017-08-07 | End: 2017-08-07

## 2017-08-07 RX ORDER — LORAZEPAM 0.5 MG/1
TABLET ORAL EVERY 4 HOURS PRN
Status: CANCELLED | OUTPATIENT
Start: 2017-08-07

## 2017-08-07 RX ORDER — METOCLOPRAMIDE HYDROCHLORIDE 5 MG/ML
10 INJECTION INTRAMUSCULAR; INTRAVENOUS EVERY 6 HOURS PRN
Status: CANCELLED | OUTPATIENT
Start: 2017-08-07

## 2017-08-07 RX ORDER — ACETAMINOPHEN 325 MG/1
TABLET ORAL EVERY 6 HOURS PRN
Status: CANCELLED | OUTPATIENT
Start: 2017-08-07

## 2017-08-07 RX ORDER — DIPHENHYDRAMINE HYDROCHLORIDE 50 MG/ML
INJECTION INTRAMUSCULAR; INTRAVENOUS EVERY 4 HOURS PRN
Status: CANCELLED | OUTPATIENT
Start: 2017-08-07

## 2017-08-07 RX ORDER — PROCHLORPERAZINE MALEATE 10 MG
10 TABLET ORAL EVERY 6 HOURS PRN
Status: CANCELLED | OUTPATIENT
Start: 2017-08-15

## 2017-08-07 RX ORDER — ONDANSETRON 2 MG/ML
8 INJECTION INTRAMUSCULAR; INTRAVENOUS EVERY 6 HOURS PRN
Status: CANCELLED | OUTPATIENT
Start: 2017-08-15

## 2017-08-07 RX ORDER — LORAZEPAM 2 MG/ML
INJECTION INTRAMUSCULAR EVERY 4 HOURS PRN
Status: CANCELLED | OUTPATIENT
Start: 2017-08-07

## 2017-08-07 RX ORDER — METOCLOPRAMIDE HYDROCHLORIDE 5 MG/ML
10 INJECTION INTRAMUSCULAR; INTRAVENOUS EVERY 6 HOURS PRN
Status: CANCELLED | OUTPATIENT
Start: 2017-08-15

## 2017-08-07 RX ORDER — ONDANSETRON 2 MG/ML
8 INJECTION INTRAMUSCULAR; INTRAVENOUS EVERY 6 HOURS PRN
Status: CANCELLED | OUTPATIENT
Start: 2017-08-07

## 2017-08-07 RX ADMIN — SODIUM CHLORIDE 1000 ML: 9 INJECTION, SOLUTION INTRAVENOUS at 14:55:00

## 2017-08-07 NOTE — PATIENT INSTRUCTIONS
For Dr. Libia Awad nurse line, call [phone] with any questions or concerns Monday through Friday 8:00 to 4:30. After hours or weekends for emergent needs [phone].

## 2017-08-07 NOTE — PROGRESS NOTES
Hematology/Oncology Clinic Follow Up Visit    Patient Name: Africa Braun  Medical Record Number: [id number]    YOB: 1955    PCP: Dr. Michael Rmoero  Other providers:  Dr. Carlton Angulo (cardiology), Dr. Deonte Ruiz (rad onc)    Reason for Co insurance authorization  -8/7/17- cycle 2 cis/gem/necitumumab    =============================================  Interval events:  Presents for follow up and for C2D1 chemotherapy. After the first week of chemo last cycle he felt quite well.   He has chroni grafts  No date: CATH DRUG ELUTING STENT  5/13/2014: COLONOSCOPY      Comment: Procedure: COLONOSCOPY;  Surgeon: David Cheema MD;  Location: [address] [place] [address] ENDOSCOPY  1972: OTHER SURGICAL HISTORY      Comment: RT shoulder reconstruction for should children: N/A     Occupational History    retired     Social History Main Topics   Smoking status: Former Smoker  2.00 Packs/day  For 33.00 Years     Quit date: 3/1/2006    Smokeless tobacco: Never Used    Alcohol use No    Drug use: No    Sexual activity: drainage    Laboratory:    Lab Results  Component Value Date   WBC 4.3 08/07/2017   WBC 4.6 07/25/2017   WBC 9.9 07/18/2017   HGB 13.0 08/07/2017   HGB 13.7 07/25/2017   HGB 14.1 07/18/2017   HCT 39.0 08/07/2017   MCV 91.1 08/07/2017   MCH 30.4 08/07/2017 chemotherapy    *constipation  -related to antiemetic use  -continue senna and docusate    *nausea- chemotherapy induced  -has been mild/well controlled  -continue prn zofran or compazine    *chronic back/hip pain  -encouraged him to continue to remain act

## 2017-08-07 NOTE — PROGRESS NOTES
Patient here for MD f/u. Slight fatigue, denies SOB. Good appetite. States has gained weight and feels that is what is contributing to his right hip and back pain. Denies nausea/emesis. Did take antiemetics after last treatment.   Occasional constipati

## 2017-08-07 NOTE — PROGRESS NOTES
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

## 2017-08-09 PROBLEM — R04.2 HEMOPTYSIS: Status: RESOLVED | Noted: 2017-06-19 | Resolved: 2017-08-09

## 2017-08-10 ENCOUNTER — HOSPITAL ENCOUNTER (OUTPATIENT)
Dept: GENERAL RADIOLOGY | Facility: HOSPITAL | Age: 62
Discharge: HOME | End: 2017-08-10
Attending: INTERNAL MEDICINE
Payer: COMMERCIAL

## 2017-08-10 DIAGNOSIS — C34.82 MALIGNANT NEOPLASM OF OVERLAPPING SITES OF LEFT LUNG (HCC): ICD-10-CM

## 2017-08-10 PROCEDURE — 71020 XR CHEST PA + LAT CHEST (CPT=71020): CPT | Performed by: INTERNAL MEDICINE

## 2017-08-14 ENCOUNTER — OFFICE VISIT (OUTPATIENT)
Dept: HEMATOLOGY/ONCOLOGY | Facility: HOSPITAL | Age: 62
End: 2017-08-14
Attending: INTERNAL MEDICINE
Payer: COMMERCIAL

## 2017-08-14 VITALS
TEMPERATURE: 98 F | HEIGHT: 66.22 IN | HEART RATE: 48 BPM | BODY MASS INDEX: 31.72 KG/M2 | RESPIRATION RATE: 18 BRPM | SYSTOLIC BLOOD PRESSURE: 140 MMHG | DIASTOLIC BLOOD PRESSURE: 64 MMHG | OXYGEN SATURATION: 98 % | WEIGHT: 197.38 LBS

## 2017-08-14 DIAGNOSIS — R23.9 CHANGE OF SKIN RELATED TO CHEMOTHERAPY: ICD-10-CM

## 2017-08-14 DIAGNOSIS — K59.00 CONSTIPATION, UNSPECIFIED: ICD-10-CM

## 2017-08-14 DIAGNOSIS — D64.81 ANEMIA ASSOCIATED WITH CHEMOTHERAPY: ICD-10-CM

## 2017-08-14 DIAGNOSIS — T80.212A: ICD-10-CM

## 2017-08-14 DIAGNOSIS — C34.92 NON-SMALL CELL CARCINOMA OF LEFT LUNG METASTATIC TO ABDOMEN (HCC): Primary | ICD-10-CM

## 2017-08-14 DIAGNOSIS — T45.1X5A ANEMIA ASSOCIATED WITH CHEMOTHERAPY: ICD-10-CM

## 2017-08-14 DIAGNOSIS — C79.89 NON-SMALL CELL CARCINOMA OF LEFT LUNG METASTATIC TO ABDOMEN (HCC): Primary | ICD-10-CM

## 2017-08-14 DIAGNOSIS — T45.1X5A CHEMOTHERAPY INDUCED NAUSEA AND VOMITING: ICD-10-CM

## 2017-08-14 DIAGNOSIS — C79.31 BRAIN METASTASIS (HCC): ICD-10-CM

## 2017-08-14 DIAGNOSIS — M54.50 CHRONIC BILATERAL LOW BACK PAIN WITHOUT SCIATICA: ICD-10-CM

## 2017-08-14 DIAGNOSIS — R11.2 CHEMOTHERAPY INDUCED NAUSEA AND VOMITING: ICD-10-CM

## 2017-08-14 DIAGNOSIS — T45.1X5A CHANGE OF SKIN RELATED TO CHEMOTHERAPY: ICD-10-CM

## 2017-08-14 DIAGNOSIS — G89.29 CHRONIC BILATERAL LOW BACK PAIN WITHOUT SCIATICA: ICD-10-CM

## 2017-08-14 LAB
BASOPHILS # BLD AUTO: 0.07 X10(3) UL (ref 0–0.1)
BASOPHILS NFR BLD AUTO: 2 %
BUN BLD-MCNC: 10 MG/DL (ref 8–20)
CALCIUM BLD-MCNC: 9.1 MG/DL (ref 8.3–10.3)
CHLORIDE: 100 MMOL/L (ref 101–111)
CO2: 28 MMOL/L (ref 22–32)
CREAT BLD-MCNC: 0.76 MG/DL (ref 0.7–1.3)
EOSINOPHIL # BLD AUTO: 0.01 X10(3) UL (ref 0–0.3)
EOSINOPHIL NFR BLD AUTO: 0.3 %
ERYTHROCYTE [DISTWIDTH] IN BLOOD BY AUTOMATED COUNT: 13 % (ref 11.5–16)
GLUCOSE BLD-MCNC: 98 MG/DL (ref 70–99)
HAV IGM SER QL: 2.1 MG/DL (ref 1.7–3)
HCT VFR BLD AUTO: 36.6 % (ref 37–53)
HGB BLD-MCNC: 12.4 G/DL (ref 13–17)
IMMATURE GRANULOCYTE COUNT: 0.1 X10(3) UL (ref 0–1)
IMMATURE GRANULOCYTE RATIO %: 2.9 %
LYMPHOCYTES # BLD AUTO: 1.43 X10(3) UL (ref 0.9–4)
LYMPHOCYTES NFR BLD AUTO: 41 %
MCH RBC QN AUTO: 30.4 PG (ref 27–33.2)
MCHC RBC AUTO-ENTMCNC: 33.9 G/DL (ref 31–37)
MCV RBC AUTO: 89.7 FL (ref 80–99)
MONOCYTES # BLD AUTO: 0.17 X10(3) UL (ref 0.1–0.6)
MONOCYTES NFR BLD AUTO: 4.9 %
NEUTROPHIL ABS PRELIM: 1.71 X10 (3) UL (ref 1.3–6.7)
NEUTROPHILS # BLD AUTO: 1.71 X10(3) UL (ref 1.3–6.7)
NEUTROPHILS NFR BLD AUTO: 48.9 %
PLATELET # BLD AUTO: 476 10(3)UL (ref 150–450)
POTASSIUM SERPL-SCNC: 4.4 MMOL/L (ref 3.6–5.1)
RBC # BLD AUTO: 4.08 X10(6)UL (ref 4.3–5.7)
RED CELL DISTRIBUTION WIDTH-SD: 41.4 FL (ref 35.1–46.3)
SODIUM SERPL-SCNC: 135 MMOL/L (ref 136–144)
WBC # BLD AUTO: 3.5 X10(3) UL (ref 4–13)

## 2017-08-14 PROCEDURE — 96413 CHEMO IV INFUSION 1 HR: CPT

## 2017-08-14 PROCEDURE — 96375 TX/PRO/DX INJ NEW DRUG ADDON: CPT

## 2017-08-14 PROCEDURE — 83735 ASSAY OF MAGNESIUM: CPT

## 2017-08-14 PROCEDURE — 85025 COMPLETE CBC W/AUTO DIFF WBC: CPT

## 2017-08-14 PROCEDURE — 80048 BASIC METABOLIC PNL TOTAL CA: CPT

## 2017-08-14 PROCEDURE — 96417 CHEMO IV INFUS EACH ADDL SEQ: CPT

## 2017-08-14 PROCEDURE — 99215 OFFICE O/P EST HI 40 MIN: CPT | Performed by: INTERNAL MEDICINE

## 2017-08-14 RX ORDER — DOXYCYCLINE HYCLATE 100 MG/1
100 CAPSULE ORAL 2 TIMES DAILY
Qty: 60 CAPSULE | Refills: 1 | Status: SHIPPED | OUTPATIENT
Start: 2017-08-14 | End: 2017-01-01

## 2017-08-14 RX ORDER — OLANZAPINE 2.5 MG/1
2.5 TABLET ORAL NIGHTLY
Qty: 30 TABLET | Refills: 3 | Status: SHIPPED | OUTPATIENT
Start: 2017-08-14 | End: 2018-01-01

## 2017-08-14 RX ORDER — SODIUM CHLORIDE 0.9 % (FLUSH) 0.9 %
10 SYRINGE (ML) INJECTION ONCE
Status: CANCELLED | OUTPATIENT
Start: 2017-08-14

## 2017-08-14 RX ORDER — SODIUM CHLORIDE 0.9 % (FLUSH) 0.9 %
10 SYRINGE (ML) INJECTION ONCE
Status: COMPLETED | OUTPATIENT
Start: 2017-08-14 | End: 2017-08-14

## 2017-08-14 RX ADMIN — SODIUM CHLORIDE 0.9 % (FLUSH) 10 ML: 0.9 % SYRINGE (ML) INJECTION at 12:10:00

## 2017-08-14 NOTE — PROGRESS NOTES
Patient here for MD f/u. C/O increased fatigue. Increased nausea about day 4 after treatment. Controlled. Occasional constipation and diarrhea, controlling. Denies pain or SOB.

## 2017-08-14 NOTE — PROGRESS NOTES
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

## 2017-08-14 NOTE — PROGRESS NOTES
Hematology/Oncology Clinic Follow Up Visit    Patient Name: Estella Rizzo  Medical Record Number: [id number]    YOB: 1955    PCP: Dr. Kira Marques  Other providers:  Dr. Alda Adrian (cardiology), Dr. Jayme Quevedo (rad onc)    Reason for Co insurance authorization  -8/7/17- cycle 2 cis/gem/necitumumab    =============================================  Interval events:   Presents for follow up and for C2D8 chemotherapy. He reports on day 4 having nausea with some emesis.   Since then no other v DAYANARA  3/26/06: BYPASS SURGERY      Comment: CABG x3 LIMA to LAD, SVG to OM, SVG to PDA  3/2006: CABG      Comment: 3V (LAD, Circumflex, RCA)  1/26/2007: CARDIAC CATHETERIZATION      Comment: severe 3V native CAD w patent grafts  No date: CATH DRUG ELUTING S tablet (75 mg total) by mouth daily. Disp: 90 tablet Rfl: 3   amiodarone HCl 200 MG Oral Tab Take 1 tablet (200 mg total) by mouth daily. Disp: 90 tablet Rfl: 3   Pravastatin Sodium 20 MG Oral Tab Take 1 tablet (20 mg total) by mouth nightly.  Disp: 90 tabl acute distress  Psych/Depression: Mood and affect are appropriate  Eyes: EOMI  ENT: Oropharynx is clear  CV: Regular rate and rhythm, no murmurs, no LE edema  Respiratory: Lungs clear to auscultation bilaterally  GI/Abd: Soft, non-tender with normoactive b brain lesion on 7/14/17  -proceed with C2D8 of cis/gem/necitumumab as below:   -cisplatin 75mg/m2 IV d1   -gemcitabine 1250mg/m2 d1,8   -necitumumab 800mg IV d1,8    Cycle length is 21 days  -will plan for 6 cycles of cis/gem/nec combo, and if responding w

## 2017-08-18 ENCOUNTER — TELEPHONE (OUTPATIENT)
Dept: HEMATOLOGY/ONCOLOGY | Facility: HOSPITAL | Age: 62
End: 2017-08-18

## 2017-08-21 ENCOUNTER — TELEPHONE (OUTPATIENT)
Dept: HEMATOLOGY/ONCOLOGY | Facility: HOSPITAL | Age: 62
End: 2017-08-21

## 2017-08-21 DIAGNOSIS — C34.92 NON-SMALL CELL CARCINOMA OF LEFT LUNG METASTATIC TO ABDOMEN (HCC): Primary | ICD-10-CM

## 2017-08-21 DIAGNOSIS — R21 RASH: ICD-10-CM

## 2017-08-21 DIAGNOSIS — C79.89 NON-SMALL CELL CARCINOMA OF LEFT LUNG METASTATIC TO ABDOMEN (HCC): Primary | ICD-10-CM

## 2017-08-21 RX ORDER — TRIAMCINOLONE ACETONIDE 0.25 MG/G
CREAM TOPICAL
Qty: 1 TUBE | Refills: 3 | Status: SHIPPED | OUTPATIENT
Start: 2017-08-21 | End: 2018-01-01 | Stop reason: ALTCHOICE

## 2017-08-21 NOTE — TELEPHONE ENCOUNTER
MyChart message from patient that his facial rash has increased. He has been taking Doxycycline Hyclate  (100 Mg) twice per day    as directed. Asking if anything else he can do. MD suggested patient come in for an APN assessment.   If he can not come in

## 2017-08-28 ENCOUNTER — OFFICE VISIT (OUTPATIENT)
Dept: HEMATOLOGY/ONCOLOGY | Facility: HOSPITAL | Age: 62
End: 2017-08-28
Attending: INTERNAL MEDICINE
Payer: COMMERCIAL

## 2017-08-28 DIAGNOSIS — C79.89 NON-SMALL CELL CARCINOMA OF LEFT LUNG METASTATIC TO ABDOMEN (HCC): Primary | ICD-10-CM

## 2017-08-28 DIAGNOSIS — D64.81 ANEMIA ASSOCIATED WITH CHEMOTHERAPY: ICD-10-CM

## 2017-08-28 DIAGNOSIS — C34.92 NON-SMALL CELL CARCINOMA OF LEFT LUNG METASTATIC TO ABDOMEN (HCC): Primary | ICD-10-CM

## 2017-08-28 DIAGNOSIS — T45.1X5A ANEMIA ASSOCIATED WITH CHEMOTHERAPY: ICD-10-CM

## 2017-08-28 DIAGNOSIS — M54.50 CHRONIC BILATERAL LOW BACK PAIN WITHOUT SCIATICA: ICD-10-CM

## 2017-08-28 DIAGNOSIS — C79.31 BRAIN METASTASIS (HCC): ICD-10-CM

## 2017-08-28 DIAGNOSIS — L03.114 CELLULITIS OF LEFT UPPER EXTREMITY: ICD-10-CM

## 2017-08-28 DIAGNOSIS — T45.1X5A CHEMOTHERAPY INDUCED NAUSEA AND VOMITING: ICD-10-CM

## 2017-08-28 DIAGNOSIS — R11.2 CHEMOTHERAPY INDUCED NAUSEA AND VOMITING: ICD-10-CM

## 2017-08-28 DIAGNOSIS — R23.9 CHANGE OF SKIN RELATED TO CHEMOTHERAPY: ICD-10-CM

## 2017-08-28 DIAGNOSIS — K59.00 CONSTIPATION, UNSPECIFIED CONSTIPATION TYPE: ICD-10-CM

## 2017-08-28 DIAGNOSIS — T45.1X5A CHANGE OF SKIN RELATED TO CHEMOTHERAPY: ICD-10-CM

## 2017-08-28 DIAGNOSIS — G89.29 CHRONIC BILATERAL LOW BACK PAIN WITHOUT SCIATICA: ICD-10-CM

## 2017-08-28 PROCEDURE — 99215 OFFICE O/P EST HI 40 MIN: CPT | Performed by: INTERNAL MEDICINE

## 2017-08-28 RX ORDER — DRONABINOL 2.5 MG/1
2.5 CAPSULE ORAL
Qty: 60 CAPSULE | Refills: 0 | Status: SHIPPED | OUTPATIENT
Start: 2017-08-28 | End: 2018-01-01

## 2017-08-28 RX ORDER — LORAZEPAM 2 MG/ML
INJECTION INTRAMUSCULAR EVERY 4 HOURS PRN
Status: CANCELLED | OUTPATIENT
Start: 2017-08-28

## 2017-08-28 RX ORDER — DIPHENHYDRAMINE HYDROCHLORIDE 50 MG/ML
INJECTION INTRAMUSCULAR; INTRAVENOUS EVERY 4 HOURS PRN
Status: CANCELLED | OUTPATIENT
Start: 2017-09-05

## 2017-08-28 RX ORDER — AMOXICILLIN 500 MG/1
500 TABLET, FILM COATED ORAL EVERY 8 HOURS
Qty: 30 TABLET | Refills: 0 | Status: SHIPPED | OUTPATIENT
Start: 2017-08-28 | End: 2017-01-01

## 2017-08-28 RX ORDER — METOCLOPRAMIDE HYDROCHLORIDE 5 MG/ML
10 INJECTION INTRAMUSCULAR; INTRAVENOUS EVERY 6 HOURS PRN
Status: CANCELLED | OUTPATIENT
Start: 2017-09-05

## 2017-08-28 RX ORDER — MEPERIDINE HYDROCHLORIDE 25 MG/ML
INJECTION INTRAMUSCULAR; INTRAVENOUS; SUBCUTANEOUS AS NEEDED
Status: CANCELLED | OUTPATIENT
Start: 2017-08-28

## 2017-08-28 RX ORDER — LORAZEPAM 0.5 MG/1
TABLET ORAL EVERY 4 HOURS PRN
Status: CANCELLED | OUTPATIENT
Start: 2017-08-28

## 2017-08-28 RX ORDER — ALBUTEROL SULFATE 90 UG/1
2 AEROSOL, METERED RESPIRATORY (INHALATION) AS NEEDED
Status: CANCELLED | OUTPATIENT
Start: 2017-08-28

## 2017-08-28 RX ORDER — METOCLOPRAMIDE HYDROCHLORIDE 5 MG/ML
10 INJECTION INTRAMUSCULAR; INTRAVENOUS EVERY 6 HOURS PRN
Status: CANCELLED | OUTPATIENT
Start: 2017-08-28

## 2017-08-28 RX ORDER — SODIUM CHLORIDE 9 MG/ML
1000 INJECTION, SOLUTION INTRAVENOUS ONCE
Status: CANCELLED | OUTPATIENT
Start: 2017-08-28

## 2017-08-28 RX ORDER — PROCHLORPERAZINE MALEATE 10 MG
10 TABLET ORAL EVERY 6 HOURS PRN
Status: CANCELLED | OUTPATIENT
Start: 2017-08-28

## 2017-08-28 RX ORDER — ACETAMINOPHEN 325 MG/1
TABLET ORAL EVERY 6 HOURS PRN
Status: CANCELLED | OUTPATIENT
Start: 2017-08-28

## 2017-08-28 RX ORDER — LORAZEPAM 2 MG/ML
INJECTION INTRAMUSCULAR EVERY 4 HOURS PRN
Status: CANCELLED | OUTPATIENT
Start: 2017-09-05

## 2017-08-28 RX ORDER — RANITIDINE 25 MG/ML
50 INJECTION, SOLUTION INTRAMUSCULAR; INTRAVENOUS AS NEEDED
Status: CANCELLED | OUTPATIENT
Start: 2017-08-28

## 2017-08-28 RX ORDER — MEPERIDINE HYDROCHLORIDE 25 MG/ML
INJECTION INTRAMUSCULAR; INTRAVENOUS; SUBCUTANEOUS AS NEEDED
Status: CANCELLED | OUTPATIENT
Start: 2017-09-05

## 2017-08-28 RX ORDER — PROCHLORPERAZINE MALEATE 10 MG
10 TABLET ORAL EVERY 6 HOURS PRN
Status: CANCELLED | OUTPATIENT
Start: 2017-09-05

## 2017-08-28 RX ORDER — RANITIDINE 25 MG/ML
50 INJECTION, SOLUTION INTRAMUSCULAR; INTRAVENOUS AS NEEDED
Status: CANCELLED | OUTPATIENT
Start: 2017-09-05

## 2017-08-28 RX ORDER — LORAZEPAM 0.5 MG/1
TABLET ORAL EVERY 4 HOURS PRN
Status: CANCELLED | OUTPATIENT
Start: 2017-09-05

## 2017-08-28 RX ORDER — ONDANSETRON 2 MG/ML
8 INJECTION INTRAMUSCULAR; INTRAVENOUS EVERY 6 HOURS PRN
Status: CANCELLED | OUTPATIENT
Start: 2017-09-05

## 2017-08-28 RX ORDER — ONDANSETRON 2 MG/ML
8 INJECTION INTRAMUSCULAR; INTRAVENOUS EVERY 6 HOURS PRN
Status: CANCELLED | OUTPATIENT
Start: 2017-08-28

## 2017-08-28 RX ORDER — ALBUTEROL SULFATE 90 UG/1
2 AEROSOL, METERED RESPIRATORY (INHALATION) AS NEEDED
Status: CANCELLED | OUTPATIENT
Start: 2017-09-05

## 2017-08-28 RX ORDER — ACETAMINOPHEN 325 MG/1
TABLET ORAL EVERY 6 HOURS PRN
Status: CANCELLED | OUTPATIENT
Start: 2017-09-05

## 2017-08-28 RX ORDER — DIPHENHYDRAMINE HYDROCHLORIDE 50 MG/ML
INJECTION INTRAMUSCULAR; INTRAVENOUS EVERY 4 HOURS PRN
Status: CANCELLED | OUTPATIENT
Start: 2017-08-28

## 2017-08-28 NOTE — PATIENT INSTRUCTIONS
For Dr. Kavitha Davisr nurse line, call [phone] with any questions or concerns Monday through Friday 8:00 to 4:30. After hours or weekends for emergent needs [phone].

## 2017-08-28 NOTE — PROGRESS NOTES
Patient here for MD f/u and treatment. Bit by an insect yesterday and LFA swollen, red and itching. States minimal fatigue, denies SOB. OK appetite, one day of nausea after treatment. Bowels under control with stool softener.

## 2017-08-29 ENCOUNTER — TELEPHONE (OUTPATIENT)
Dept: HEMATOLOGY/ONCOLOGY | Facility: HOSPITAL | Age: 62
End: 2017-08-29

## 2017-08-29 PROBLEM — T80.212A LOCAL INFECTION DUE TO PORTACATH: Status: RESOLVED | Noted: 2017-08-14 | Resolved: 2017-08-29

## 2017-08-29 NOTE — TELEPHONE ENCOUNTER
Wife calling re: MRI order, wanting to make sure order is same as one ordered by Dr. Jose Mendoza to avoid duplicate appts  Wife notified MRI same

## 2017-08-30 ENCOUNTER — TELEPHONE (OUTPATIENT)
Dept: HEMATOLOGY/ONCOLOGY | Facility: HOSPITAL | Age: 62
End: 2017-08-30

## 2017-08-30 NOTE — TELEPHONE ENCOUNTER
Called and LM for patient to call us back and let us know how the rash/inflammation on LFA is doing? He called back to let us know it is much improved.   Message routed to MD.

## 2017-09-05 ENCOUNTER — OFFICE VISIT (OUTPATIENT)
Dept: HEMATOLOGY/ONCOLOGY | Age: 62
End: 2017-09-05
Attending: INTERNAL MEDICINE
Payer: COMMERCIAL

## 2017-09-05 VITALS
BODY MASS INDEX: 33.46 KG/M2 | WEIGHT: 208.19 LBS | SYSTOLIC BLOOD PRESSURE: 143 MMHG | RESPIRATION RATE: 16 BRPM | HEART RATE: 42 BPM | TEMPERATURE: 96 F | DIASTOLIC BLOOD PRESSURE: 74 MMHG | HEIGHT: 66.22 IN

## 2017-09-05 DIAGNOSIS — T45.1X5A ANEMIA ASSOCIATED WITH CHEMOTHERAPY: ICD-10-CM

## 2017-09-05 DIAGNOSIS — C79.89 NON-SMALL CELL CARCINOMA OF LEFT LUNG METASTATIC TO ABDOMEN (HCC): Primary | ICD-10-CM

## 2017-09-05 DIAGNOSIS — T45.1X5A CHEMOTHERAPY INDUCED NAUSEA AND VOMITING: ICD-10-CM

## 2017-09-05 DIAGNOSIS — T45.1X5A CHANGE OF SKIN RELATED TO CHEMOTHERAPY: ICD-10-CM

## 2017-09-05 DIAGNOSIS — C34.92 NON-SMALL CELL CARCINOMA OF LEFT LUNG METASTATIC TO ABDOMEN (HCC): Primary | ICD-10-CM

## 2017-09-05 DIAGNOSIS — R23.9 CHANGE OF SKIN RELATED TO CHEMOTHERAPY: ICD-10-CM

## 2017-09-05 DIAGNOSIS — K59.00 CONSTIPATION, UNSPECIFIED CONSTIPATION TYPE: ICD-10-CM

## 2017-09-05 DIAGNOSIS — L03.114 CELLULITIS OF LEFT UPPER EXTREMITY: ICD-10-CM

## 2017-09-05 DIAGNOSIS — C79.31 BRAIN METASTASIS (HCC): ICD-10-CM

## 2017-09-05 DIAGNOSIS — R11.2 CHEMOTHERAPY INDUCED NAUSEA AND VOMITING: ICD-10-CM

## 2017-09-05 DIAGNOSIS — D64.81 ANEMIA ASSOCIATED WITH CHEMOTHERAPY: ICD-10-CM

## 2017-09-05 LAB
BASOPHILS # BLD AUTO: 0.07 X10(3) UL (ref 0–0.1)
BASOPHILS NFR BLD AUTO: 1.2 %
EOSINOPHIL # BLD AUTO: 0.08 X10(3) UL (ref 0–0.3)
EOSINOPHIL NFR BLD AUTO: 1.4 %
ERYTHROCYTE [DISTWIDTH] IN BLOOD BY AUTOMATED COUNT: 15.1 % (ref 11.5–16)
HAV IGM SER QL: 1.8 MG/DL (ref 1.7–3)
HCT VFR BLD AUTO: 35.2 % (ref 37–53)
HGB BLD-MCNC: 11.7 G/DL (ref 13–17)
IMMATURE GRANULOCYTE COUNT: 0.12 X10(3) UL (ref 0–1)
IMMATURE GRANULOCYTE RATIO %: 2.1 %
LYMPHOCYTES # BLD AUTO: 2.2 X10(3) UL (ref 0.9–4)
LYMPHOCYTES NFR BLD AUTO: 38.3 %
MCH RBC QN AUTO: 30.5 PG (ref 27–33.2)
MCHC RBC AUTO-ENTMCNC: 33.2 G/DL (ref 31–37)
MCV RBC AUTO: 91.9 FL (ref 80–99)
MONOCYTES # BLD AUTO: 0.36 X10(3) UL (ref 0.1–0.6)
MONOCYTES NFR BLD AUTO: 6.3 %
NEUTROPHIL ABS PRELIM: 2.92 X10 (3) UL (ref 1.3–6.7)
NEUTROPHILS # BLD AUTO: 2.92 X10(3) UL (ref 1.3–6.7)
NEUTROPHILS NFR BLD AUTO: 50.7 %
PLATELET # BLD AUTO: 216 10(3)UL (ref 150–450)
POTASSIUM SERPL-SCNC: 3.9 MMOL/L (ref 3.6–5.1)
RBC # BLD AUTO: 3.83 X10(6)UL (ref 4.3–5.7)
RED CELL DISTRIBUTION WIDTH-SD: 49.6 FL (ref 35.1–46.3)
WBC # BLD AUTO: 5.8 X10(3) UL (ref 4–13)

## 2017-09-05 PROCEDURE — 99215 OFFICE O/P EST HI 40 MIN: CPT | Performed by: INTERNAL MEDICINE

## 2017-09-05 PROCEDURE — 83735 ASSAY OF MAGNESIUM: CPT

## 2017-09-05 PROCEDURE — 84132 ASSAY OF SERUM POTASSIUM: CPT

## 2017-09-05 PROCEDURE — 85025 COMPLETE CBC W/AUTO DIFF WBC: CPT

## 2017-09-05 PROCEDURE — 96417 CHEMO IV INFUS EACH ADDL SEQ: CPT

## 2017-09-05 PROCEDURE — 96413 CHEMO IV INFUSION 1 HR: CPT

## 2017-09-05 PROCEDURE — 96374 THER/PROPH/DIAG INJ IV PUSH: CPT

## 2017-09-05 RX ORDER — SODIUM CHLORIDE 0.9 % (FLUSH) 0.9 %
10 SYRINGE (ML) INJECTION ONCE
Status: COMPLETED | OUTPATIENT
Start: 2017-09-05 | End: 2017-09-05

## 2017-09-05 RX ORDER — SODIUM CHLORIDE 0.9 % (FLUSH) 0.9 %
10 SYRINGE (ML) INJECTION ONCE
Status: CANCELLED | OUTPATIENT
Start: 2017-09-05

## 2017-09-05 RX ADMIN — SODIUM CHLORIDE 0.9 % (FLUSH) 10 ML: 0.9 % SYRINGE (ML) INJECTION at 13:30:00

## 2017-09-05 NOTE — PROGRESS NOTES
Hematology/Oncology Clinic Follow Up Visit    Patient Name: Vane Lane  Medical Record Number: [id number]    YOB: 1955    PCP: Dr. Augustine Gordon  Other providers:  Dr. Rylie Vargas (cardiology), Dr. Vince Rangel (rad onc)    Reason for Co insurance authorization  -8/7/17- cycle 2 cis/gem/necitumumab  -8/28/17- cycle 3 cis/gem/necitumumab    =============================================  Interval events:   Presents for follow up and for C3D8 chemotherapy.   The cellulitis on his left arm magdalene CAD w patent grafts  No date: CATH DRUG ELUTING STENT  5/13/2014: COLONOSCOPY      Comment: Procedure: COLONOSCOPY;  Surgeon: Corinne Garcia MD;  Location: [facility] ENDOSCOPY  1972: OTHER SURGICAL HISTORY      Comment: RT shoulder reconstructi Multiple Vitamin (TAB-A-HERMES) Oral Tab Take 1 tablet by mouth once daily. Disp: 30 tablet Rfl: 12   Clopidogrel Bisulfate 75 MG Oral Tab Take 1 tablet (75 mg total) by mouth daily.  Disp: 90 tablet Rfl: 3   amiodarone HCl 200 MG Oral Tab Take 1 tablet (20 kg (193 lb 3.2 oz)  07/06/17 : 89.2 kg (196 lb 9.6 oz)    ECOG PS: 0    Physical Examination:  General: Alert and oriented, not in acute distress  Psych/Depression: Mood and affect are appropriate  Eyes: EOMI  ENT: Oropharynx is clear  CV: Regular rate and cancer progression.    -one of his goals is to get his wife set up to move down to [place]  -underwent Martin Humphries 44 to brain lesion on 7/14/17  -proceed with C3D8 of cis/gem/necitumumab as below:   -cisplatin 75mg/m2 IV d1   -gemcitabine 1250mg/m2 d1,8   -necitumuma

## 2017-09-05 NOTE — PROGRESS NOTES
Pt here for MD f/u visit and due for C3D8 chemotherapy. +fatigue. Appetite good. Some nausea following chemotherapy. No bowel problems. No pain.      Education Record    Learner:  Patient    Disease / Norma flores    Barriers / Limitations:  None   Com

## 2017-09-05 NOTE — PROGRESS NOTES
Education Record    Learner:  Patient    Disease / Thao flores    Barriers / Limitations:  None   Comments:    Method:  Discussion   Comments:    General Topics:  Plan of care reviewed   Comments:    Outcome:  Shows understanding   Comments:    Pt macarena

## 2017-09-18 NOTE — PROGRESS NOTES
Hematology/Oncology Clinic Follow Up Visit    Patient Name: Tamara Kellogg  Medical Record Number: [id number]    YOB: 1955    PCP: Dr. Alice Aguilar  Other providers:  Dr. Tyrone Spencer (cardiology), Dr. Malachi Malone (rad onc)    Reason for Co brain lesion to 22 gy in 1 fraction (Dr. Maxine Jacob)  -7/18/17- started cis/gem/necitumumab (cisplatin 75mg/m2 IV d1, gemcitabine 1250mg/m2 d1,8, necitumumab 800mg IV d1,8; q21 days)   -omitted necitumumab with C1 due to insurance authorization  -8/7/17- cycle 2 Resolute drug-eluting stent)   • Chronic bilateral low back pain without sciatica 7/28/2017   • LBP (low back pain)     MRI prior to surgery 10/2008. Severe mulitlevel degenerative changes throughout the L-spine w scoliotic change.  Broad-based disc bulges by mouth 2 (two) times daily before meals. Disp: 60 capsule Rfl: 0   amoxicillin 500 MG Oral Tab Take 1 tablet (500 mg total) by mouth every 8 (eight) hours.  Disp: 30 tablet Rfl: 0   triamcinolone acetonide 0.025 % External Cream Apply to affected area twi in [place]. No grandchildren.  Retired business owner-metal finishing             Family Medical History:  Family History   Problem Relation Age of Onset   • Heart Disorder Father    • Cancer Mother 76     colon   • Heart Disorder Maternal Grandmother    • 09/18/2017   CREATSERUM 0.75 08/28/2017   CREATSERUM 0.76 08/14/2017   GFR 95 09/18/2017   GFRNAA 110 04/21/2014   GFRAA 127 04/21/2014   CA 8.6 09/18/2017   ALKPHO 44 (L) 09/18/2017   AST 23 09/18/2017   ALT 21 09/18/2017   BILT 0.4 09/18/2017   TP 6.4 09 advised to only take the night before he expects to have issues with n/v to reduce lethargy side effects from this- for him would take day 3-5 each cycle.   -continue prn zofran or compazine  -marinol 2.5mg BID    *chronic back/hip pain- less of an issue l

## 2017-09-18 NOTE — PROGRESS NOTES
Patient ehre for MD f/u and treatment. States feeling well today. Nausea week after treatment. Denies pain, fatigue and SOB. OK appetite.

## 2017-09-18 NOTE — PATIENT INSTRUCTIONS
For Dr. Brit Bronson nurse line, call [phone] with any questions or concerns Monday through Friday 8:00 to 4:30. After hours or weekends for emergent needs [phone].

## 2017-09-18 NOTE — PROGRESS NOTES
Pt here for his long treatment day. He tolerated treatment well.     Education Record    Learner:  Patient    Disease / Diagnosis:    Barriers / Limitations:  None   Comments:    Method:  Discussion   Comments:    General Topics:  Side effects and symptom

## 2017-09-19 PROBLEM — L03.114 CELLULITIS OF LEFT UPPER EXTREMITY: Status: RESOLVED | Noted: 2017-08-28 | Resolved: 2017-01-01

## 2017-09-25 PROBLEM — T45.1X5A CHEMOTHERAPY INDUCED NEUTROPENIA (HCC): Status: ACTIVE | Noted: 2017-01-01

## 2017-09-25 PROBLEM — D70.1 CHEMOTHERAPY INDUCED NEUTROPENIA (HCC): Status: ACTIVE | Noted: 2017-01-01

## 2017-09-25 PROBLEM — E83.42 HYPOMAGNESEMIA: Status: ACTIVE | Noted: 2017-01-01

## 2017-09-25 PROBLEM — T45.1X5A CHEMOTHERAPY INDUCED NEUTROPENIA: Status: ACTIVE | Noted: 2017-01-01

## 2017-09-25 PROBLEM — D70.1 CHEMOTHERAPY INDUCED NEUTROPENIA: Status: ACTIVE | Noted: 2017-01-01

## 2017-09-25 NOTE — PROGRESS NOTES
Pt arrived for MD f/u and chemo treatment, pt alert and appears in nad, pt denies any new adverse S/S except for increased SOB and weakness with exertion, pt has appt with cardio/pulmo MD next week, pt uses wheel chair to aid in mobility and can only ambul

## 2017-09-25 NOTE — PATIENT INSTRUCTIONS
For Dr. Brock Smoker nurse line, call [phone] with any questions or concerns Monday through Friday 8:00 to 4:30. After hours or weekends for emergent needs [phone].

## 2017-09-25 NOTE — PROGRESS NOTES
Patient here for MD f/u and treatment. C/O continued fatigue, denies SOB. Decreased appetite, but continue to eat/drink. Occasional nausea and emesis. Denies constipation/diarrhea. Denies pain.

## 2017-09-25 NOTE — PROGRESS NOTES
Pt arrived for MD f/u and chemo treatment, pt states having some nausea and decreased appetite, pt states feeling better currently, pt alert and appears in nad, pt ambulates with steady gait.   Magnesium resulted at 1.4 and triage MD informed  Education Rec

## 2017-09-25 NOTE — PROGRESS NOTES
Hematology/Oncology Clinic Follow Up Visit    Patient Name: Cristiana Zhou  Medical Record Number: [id number]    YOB: 1955    PCP: Dr. Agueda Diaz  Other providers:  Dr. Dioni Oconnor (cardiology), Dr. Shola Haro (rad onc)    Reason for Co brain lesion to 22 gy in 1 fraction (Dr. Nuria Gerenwood)  -7/18/17- started cis/gem/necitumumab (cisplatin 75mg/m2 IV d1, gemcitabine 1250mg/m2 d1,8, necitumumab 800mg IV d1,8; q21 days)   -omitted necitumumab with C1 due to insurance authorization  -8/7/17- cycle 2 pain)     MRI prior to surgery 10/2008. Severe mulitlevel degenerative changes throughout the L-spine w scoliotic change. Broad-based disc bulges at all levels & facet hypertrophic changes.  L4-5 moderate central disc herniation w mild central canal narrowi Cream Apply to affected area twice a day Disp: 1 Tube Rfl: 3   Doxycycline Hyclate 100 MG Oral Cap Take 1 capsule (100 mg total) by mouth 2 (two) times daily.  Disp: 60 capsule Rfl: 1   OLANZapine 2.5 MG Oral Tab Take 1 tablet (2.5 mg total) by mouth nightl Problem Relation Age of Onset   • Heart Disorder Father    • Cancer Mother 76     colon   • Heart Disorder Maternal Grandmother    • Heart Disorder Maternal Grandfather      stroke       Review of Systems:  A 10-point ROS was done with pertinent positive 44 (L) 09/18/2017   AST 23 09/18/2017   ALT 21 09/18/2017   BILT 0.4 09/18/2017   TP 6.4 09/18/2017   ALB 3.3 (L) 09/18/2017    09/25/2017   K 3.8 09/25/2017    09/25/2017   CO2 26.0 09/25/2017       Lab Results  Component Value Date   PTT 36. 0 olanzapine 2.5mg QHS on days 3-7 then prn on other days.   -continue prn zofran or compazine    *chronic back/hip pain- less of an issue lately  -encouraged him to continue to remain as active as tolerates  -would refer to PT if worsens    *CAD s/p CABG an

## 2017-10-09 NOTE — PROGRESS NOTES
Pt arrived for MD f/u and chemo treatment, pt denies any adverse S/S and states feeling well, pt alert and appears in nad, pt ambulates with steady gait, pt states slight constipation controlled by oral medications  Education Record    Learner:  Patient

## 2017-10-09 NOTE — PROGRESS NOTES
Hematology/Oncology Clinic Follow Up Visit    Patient Name: Juan M Lira  Medical Record Number: [id number]    YOB: 1955    PCP: Dr. Rehana Peguero  Other providers:  Dr. Bernadette Aguirre (cardiology), Dr. Bakari Anne (rad onc)    Reason for Co brain lesion to 22 gy in 1 fraction (Dr. Chapin Stout)  -7/18/17- started cis/gem/necitumumab (cisplatin 75mg/m2 IV d1, gemcitabine 1250mg/m2 d1,8, necitumumab 800mg IV d1,8; q21 days)   -omitted necitumumab with C1 due to insurance authorization  -8/7/17- cycle 2 drug-eluting stent)   • Chronic bilateral low back pain without sciatica 7/28/2017   • LBP (low back pain)     MRI prior to surgery 10/2008. Severe mulitlevel degenerative changes throughout the L-spine w scoliotic change.  Broad-based disc bulges at all le TIMES A DAY Disp: 180 tablet Rfl: 3   dronabinol 2.5 MG Oral Cap Take 1 capsule (2.5 mg total) by mouth 2 (two) times daily before meals.  Disp: 60 capsule Rfl: 0   triamcinolone acetonide 0.025 % External Cream Apply to affected area twice a day Disp: 1 Tu 10-point ROS was done with pertinent positives and negative per the HPI    Vital Signs:  Height: --  Weight: 95.7 kg (211 lb) (10/09 0830)  BSA (Calculated - sq m): --  Pulse: 48 (10/09 0830)  BP: 146/84 (10/09 0830)  Temp: 98.2 °F (36.8 °C) (10/09 0830) Results  Component Value Date   PTT 36.0 (H) 04/21/2014   PT 13.8 04/21/2014   INR 1.08 06/27/2017     Component      Latest Ref Rng & Units 10/9/2017   Magnesium, Serum      1.7 - 3.0 mg/dL 1.8     Imaging:    As reviewed above    Impression & Plan:     * last 2/2017, afib  -follows with Dr. Edenilson Brandon  -currently on aspirin 81mg and plavix 75mg daily  -amiodarone, coreg, statin per cardiology    *acneform rash- due to necitumumab  -try switch to minocycline 100mg BID from doxycycline to see if helps anymore  -c

## 2017-10-09 NOTE — PROGRESS NOTES
Patient here for D1C5 Gemzar, Portrazza, and Cisplatin. See toxicities. Patient feels he is doing overall okay with treatment thus far. MD to see in treatment room 2. Patient encouraged to call if symptoms are not managed at home.      Education Record    L

## 2017-10-13 PROBLEM — I48.0 PAROXYSMAL ATRIAL FIBRILLATION (HCC): Status: ACTIVE | Noted: 2017-01-01

## 2017-10-16 NOTE — PROGRESS NOTES
Education Record    Learner:  Patient    Disease / Diagnosis:NSCLC    Barriers / Limitations:  None   Comments:    Method:  Discussion and Reinforcement   Comments:    General Topics:  Diet, Infection, Medication, Precautions, Side effects and symptom melyssa

## 2017-10-16 NOTE — PROGRESS NOTES
Patient here for MD f/u. C/O occasional mild fatigue, denies SOB. Constipation controlled. Restarted on eliquis per cardio for afib. States taking 5mg BID.

## 2017-10-16 NOTE — PROGRESS NOTES
Hematology/Oncology Clinic Follow Up Visit    Patient Name: Marciano Villar  Medical Record Number: [id number]    YOB: 1955    PCP: Dr. Louis Slider  Other providers:  Dr. Kaci Jordan (cardiology), Dr. Jose L Corona (rad onc)    Reason for Co brain lesion to 22 gy in 1 fraction (Dr. Monetta Kussmaul)  -7/18/17- started cis/gem/necitumumab (cisplatin 75mg/m2 IV d1, gemcitabine 1250mg/m2 d1,8, necitumumab 800mg IV d1,8; q21 days)   -omitted necitumumab with C1 due to insurance authorization  -8/7/17- cycle 2 • Atherosclerosis of coronary artery 3/2006    2006 CABG with LIMA to LAD, SVG to OM, SVG to PDA.  Cath 2/8/17 100% LAD, LCx, RCA; patent LIMA to LAD, SVG to PDA; 99% SVG to OM (3.0 x 22 mm long Resolute drug-eluting stent)   • Chronic bilateral low back GI ENDOSCOPY,EXAM    Home Medications:    apixaban 2.5 MG Oral Tab Take 5 mg by mouth 2 (two) times daily. Disp:  Rfl:    Minocycline HCl 100 MG Oral Tab Take 1 tablet (100 mg total) by mouth 2 (two) times daily.  Disp: 60 tablet Rfl: 3   CARVEDILOL 6.25 MG No    Sexual activity: Not on file     Other Topics Concern    Caffeine Concern Yes    Comment: 3-4cups/daily coffee    Exercise Yes    Comment: walks 2-3 miles a day    Seat Belt Yes     Social History Narrative    , has one adult son who live in [place] 10/16/2017   MCV 97.6 10/16/2017   MCH 32.4 10/16/2017   MCHC 33.2 10/16/2017   RDW 15.8 10/16/2017   .0 10/16/2017   .0 10/09/2017   .0 09/25/2017    today      Lab Results  Component Value Date   GLU 80 10/16/2017   BUN 12 10/ 9/13/17 shows excellent response  -obtain next MRI brain after cycle 6    *neutropenia due to chemotherapy  -moderate today, but due to chemo.   Will proceed with chemotherapy as scheduled today as above, but will add in neulasta 6mg SC on day 9 to this cyc

## 2017-10-16 NOTE — PATIENT INSTRUCTIONS
For Dr. Romero Paez nurse line, call [phone] with any questions or concerns,  Monday through Friday 8:00 to 4:30. After hours or weekends for emergent needs: [phone].   Central [phone]  Medical Records:   [phone]  Cancer Ce

## 2017-10-17 NOTE — PROGRESS NOTES
Education Record    Learner:  Patient    Disease / Diagnosis:    Barriers / Limitations:  None   Comments:    Method:  Brief focused, Discussion and Reinforcement   Comments:    General Topics:  Medication, Precautions, Side effects and symptom management

## 2017-10-30 NOTE — PROGRESS NOTES
CC:Patient presents with: Follow - Up: cycle 6 day 1 gemzar, cisplatin, necitumumab      HPI:   Poornima Pelaez is a(n) 65year old male followed by Dr. Joyce Wilson for metastatic NSCLC.   He was originally diagnosed in June 2017 when he presented with spitting Patient is alert and oriented x 3, not in acute distress. HEENT: EOMs intact. PERRL. Oropharynx is clear. Neck: No JVD. No palpable lymphadenopathy. Neck is supple. Chest: Clear to auscultation. Heart: Regular rate and rhythm.    Abdomen: Soft, non ten 0.30 x10(3) uL   Basophil Absolute 0.09 0.00 - 0.10 x10(3) uL   Immature Granulocyte Absolute 0.47 0.00 - 1.00 x10(3) uL   Neutrophil % 67.8 %   Lymphocyte % 16.5 %   Monocyte % 9.5 %   Eosinophil % 0.7 %   Basophil % 0.9 %   Immature Granulocyte % 4.6 %

## 2017-10-30 NOTE — PATIENT INSTRUCTIONS
Education Record    Learner:  Patient    Disease / Abiola Yepez CANCER    Barriers / Limitations:  None   Comments:    Method:  Brief focused and Reinforcement   Comments:    General Topics:  Medication, Side effects and symptom management and Plan of ca

## 2017-10-30 NOTE — PROGRESS NOTES
Pt here for Cycle 6---Day 1 of chemo----Cisplatin, Gemzar and Portrazza. Pt had Mannitol 12.5 G prior to Cisplatin, and had 10 mg IV Lasix post Cisplatin (due to shortage of Mannitol, pt only received 1 dose of Mannitol today). As of 1610, pt had 3390cc IV

## 2017-11-06 PROBLEM — L03.313 CELLULITIS OF CHEST WALL: Status: ACTIVE | Noted: 2017-01-01

## 2017-11-06 NOTE — PROGRESS NOTES
Hematology/Oncology Clinic Follow Up Visit    Patient Name: Gage Ponce  Medical Record Number: [id number]    YOB: 1955    PCP: Dr. Esmer Butler  Other providers:  Dr. Karla Christianson (cardiology), Dr. Christiano Flores (rad onc)    Reason for Co brain lesion to 22 gy in 1 fraction (Dr. Irene Wetzel)  -7/18/17- started cis/gem/necitumumab (cisplatin 75mg/m2 IV d1, gemcitabine 1250mg/m2 d1,8, necitumumab 800mg IV d1,8; q21 days)   -omitted necitumumab with C1 due to insurance authorization  -8/7/17- cycle 2 LAD, SVG to PDA; 99% SVG to OM (3.0 x 22 mm long Resolute drug-eluting stent)   • Chronic bilateral low back pain without sciatica 7/28/2017   • LBP (low back pain)     MRI prior to surgery 10/2008.  Severe mulitlevel degenerative changes throughout the L-s tablet Rfl: 0   apixaban 5 MG Oral Tab Take 1 tablet (5 mg total) by mouth 2 (two) times daily. Disp: 180 tablet Rfl: 3   Minocycline HCl 100 MG Oral Tab Take 1 tablet (100 mg total) by mouth 2 (two) times daily.  Disp: 60 tablet Rfl: 3   CARVEDILOL 6.25 MG No    Sexual activity: Not on file     Other Topics Concern    Caffeine Concern Yes    Comment: 3-4cups/daily coffee    Exercise Yes    Comment: walks 2-3 miles a day    Seat Belt Yes     Social History Narrative    , has one adult son who live in [place] 10/30/2017   WBC 2.1 (L) 10/16/2017   HGB 9.2 (L) 11/06/2017   HGB 9.9 (L) 10/30/2017   HGB 10.9 (L) 10/16/2017   HCT 27.8 (L) 11/06/2017   MCV 98.9 11/06/2017   MCH 32.7 11/06/2017   MCHC 33.1 11/06/2017   RDW 14.7 11/06/2017   .0 11/06/2017   PLT brain after completing cycle 6  -he has multiple potential targets on FoundationOne analysis that could be considered after standard of care options have been exhausted.      *brain metastasis  -s/p SRS to solitary brain lesion on 7/14/17 with Dr. Ruthy Christensen  -re

## 2017-11-06 NOTE — PROGRESS NOTES
Patient here for MD f/u. C/O some fatigue and slight decrease in appetite. Denies nausea/emesis. Pain in left chest area, states \"cyst\" there that is \"hot\". Denies constipation/diarrhea.

## 2017-11-06 NOTE — PATIENT INSTRUCTIONS
For Dr. Darcy Pendleton nurse line, call [phone] with any questions or concerns,  Monday through Friday 8:00 to 4:30. After hours or weekends for urgent needs: [phone].   Central [phone]  Medical Records:   [phone]  [facility]cer Cent

## 2017-11-06 NOTE — PATIENT INSTRUCTIONS
Education Record    Learner:  Patient    Disease / Francisco Luna CANCER    Barriers / Limitations:  None   Comments:    Method:  Brief focused   Comments:    General Topics:  Medication, Side effects and symptom management and Plan of care reviewed   Comm

## 2017-11-08 PROBLEM — L08.9 INFECTED SEBACEOUS CYST: Status: ACTIVE | Noted: 2017-01-01

## 2017-11-08 PROBLEM — L72.3 INFECTED SEBACEOUS CYST: Status: ACTIVE | Noted: 2017-01-01

## 2017-11-08 NOTE — H&P
New Patient  Janet Burns  1/9/1955 11/8/2017    This patient was referred by Pavan Palacios DO and Dr Hicks Tridell evaluation and consultation for skin lesion of the left anterior lateral abdominal wall.     Chief Complaint: Painful and tender skin disc herniation w mild central canal narrowing.     • Non-small cell carcinoma of left lung metastatic to abdomen ([facility] [address] 6/30/2017   • Osteoarthritis    • [facility] 2015   • Pneumococcal pneumonia (streptococcus pneumoniae pneumonia) 2006   • Pn walks 2-3 miles a day    [employer] Yes     Social History Narrative    , has one adult son who live in [place]. No grandchildren.  Retired business owner-metal finishing                 [facility] Pres[facility]:   •  [address] [facility]    Family History   Problem Relation Age of Onset   • Heart Disorder Father    • Cancer Mother 76     colon   • Heart Disorder Maternal Grandmother    • Heart Disorder Maternal Grandfather      stroke       Objective/Physical Exam:  General abdominal wall. Cynthia Gonzáles states that this lesion had been there for quite some time but became increasingly tender and painful on Saturday. He was seen in Dr. Anamaria Almaraz office yesterday and started on amoxicillin.   Cynthia Gonzáles does have a history of lung cancer and wa

## 2017-11-08 NOTE — PROCEDURES
Procedure note    Date of procedure-November 7, 2017    Preoperative diagnosis-infected sebaceous cyst of the left anterior lateral abdominal wall    Indication for procedure-infected sebaceous cyst    Postoperative diagnosis-same    Procedure-   incision

## 2017-11-13 NOTE — PATIENT INSTRUCTIONS
For Dr. Gladys Kuhn nurse line, call [phone] with any questions or concerns,  Monday through Friday 8:00 to 4:30. After hours or weekends for urgent needs: [phone].   Central [phone]  Medical Records:   [phone]  Cancer Cent

## 2017-11-13 NOTE — PROGRESS NOTES
Hematology/Oncology Clinic Follow Up Visit    Patient Name: Triston Perez  Medical Record Number: [id number]    YOB: 1955    PCP: Dr. Michela Sever  Other providers:  Dr. Tonia Armas (cardiology), Dr. Carol Arana (rad onc)    Reason for Co brain lesion to 22 gy in 1 fraction (Dr. Rossy Mitchell)  -7/18/17- started cis/gem/necitumumab (cisplatin 75mg/m2 IV d1, gemcitabine 1250mg/m2 d1,8, necitumumab 800mg IV d1,8; q21 days)   -omitted necitumumab with C1 due to insurance authorization  -8/7/17- cycle 2 Atherosclerosis of coronary artery 3/2006    2006 CABG with LIMA to LAD, SVG to OM, SVG to PDA.  Cath 2/8/17 100% LAD, LCx, RCA; patent LIMA to LAD, SVG to PDA; 99% SVG to OM (3.0 x 22 mm long Resolute drug-eluting stent)   • Chronic bilateral low back pain ENDOSCOPY,EXAM    Home Medications:    Amoxicillin-Pot Clavulanate 875-125 MG Oral Tab Take 1 tablet by mouth 2 (two) times daily. Disp: 20 tablet Rfl: 0   apixaban 5 MG Oral Tab Take 1 tablet (5 mg total) by mouth 2 (two) times daily.  Disp: 180 tablet Rfl status: Former Smoker  2.00 Packs/day  For 33.00 Years     Quit date: 3/1/2006    Smokeless tobacco: Never Used    Alcohol use No    Drug use: No    Sexual activity: Not on file     Other Topics Concern    Caffeine Concern Yes    Comment: 3-4cups/daily cof increased warmth.    Lines: +portacath- no erythema, induration, or drainage      Laboratory:    Lab Results  Component Value Date   WBC 7.1 11/13/2017   WBC 4.6 11/06/2017   WBC 10.2 10/30/2017   HGB 10.1 (L) 11/13/2017   HGB 9.2 (L) 11/06/2017   HGB 9.9 ( switch to maintenance necitumumab.  -he has multiple potential targets on FoundationOne analysis that could be considered after standard of care options have been exhausted.      *brain metastasis  -s/p SRS to solitary brain lesion on 7/14/17 with Dr. Best Brandt

## 2017-11-13 NOTE — PATIENT INSTRUCTIONS
Education Record     Learner:  Patient     Disease / Aftab Pocahontas CANCER     Barriers / Limitations:  None                Comments:     Method:  Brief focused and Reinforcement                Comments:     General Topics:  Medication, Side effects and sy

## 2017-11-13 NOTE — PROGRESS NOTES
Patient here for MD f/u and treatment. C/O slight fatigue, denies SOB. Feeling OK, no new complaints. States cyst on left side healing well. Minimal drainage, notably improved.

## 2017-11-14 NOTE — PROGRESS NOTES
Education Record    Learner:  Patient    Disease / Marlon Saunders CANCER    Barriers / Limitations:  None   Comments:    Method:  Brief focused and Reinforcement   Comments:    General Topics:  Medication and Side effects and symptom management   Comments:

## 2017-11-14 NOTE — TELEPHONE ENCOUNTER
Pt requesting refill on Pravastatin, protocol passed, refill approved    LOV 6/20/17    LF 11/7/16  #90 w/3    Future Appointments  Date Time Provider Sultana Miller   11/14/2017 11:00 AM [address]   11/15/2017 8:45 AM 1404 [facility] MAIN

## 2017-11-17 NOTE — PROGRESS NOTES
GENERAL SURGERY    Bossman Foy MD, FACS, Reva Ramirez. Kassy Sanches MD, Marley Taylor MD, FACS  Emir Paul.  Jonatan Rico MD, [address] [place] [address] Mandi Bhatt MD, [facility] FANG [facility]         [address] [place] [address]

## 2017-11-27 PROBLEM — D69.6 THROMBOCYTOPENIA (HCC): Status: ACTIVE | Noted: 2017-01-01

## 2017-11-27 NOTE — PROGRESS NOTES
Patient here for MD f/u. States feeling well, no complaints. Good appetite, denies fatigue or SOB. Denies constipation or diarrhea. On eliquis, states last week of Plavix.

## 2017-11-27 NOTE — PROGRESS NOTES
Education Record    Learner:  Patient    Disease / Diagnosis:met lung ca    Barriers / Limitations:  None   Comments:    Method:  Discussion   Comments:    General Topics:  Infection, Medication, Side effects and symptom management, Plan of care reviewed a

## 2017-11-27 NOTE — PATIENT INSTRUCTIONS
For Dr. Mireille Kim nurse line, call [phone] with any questions or concerns,  Monday through Friday 8:00 to 4:30. After hours or weekends for urgent needs: [phone].   Central [phone]  Medical Records:   [phone]  Cancer Cent

## 2017-11-27 NOTE — PROGRESS NOTES
Hematology/Oncology Clinic Follow Up Visit    Patient Name: Saranya De La Rosa  Medical Record Number: [id number]    YOB: 1955    PCP: Dr. Melendez Raleigh  Other providers:  Dr. Etelvina Love (cardiology), Dr. Alissa Essex (rad onc)    Reason for Co brain lesion to 22 gy in 1 fraction (Dr. Monica Berger)  -7/18/17- started cis/gem/necitumumab (cisplatin 75mg/m2 IV d1, gemcitabine 1250mg/m2 d1,8, necitumumab 800mg IV d1,8; q21 days)   -omitted necitumumab with C1 due to insurance authorization  -8/7/17- cycle 2 minocycline, but needs to go on and off topical steroids due to flares. Has some ongoing easy bruising on plavix, aspirin and eliquis, but reports his cardiologist is now okay with him stopping plavix this week.   When doesn't take olanzapine at night he d Location:  ENDOSCOPY  No date: CYST ASPIRATION LEFT      Comment: anterior lateral abdominal wall  1972: OTHER SURGICAL HISTORY      Comment: RT shoulder reconstruction for shoulder                stabilization.  Had chronic shoulder                disloc Allergies:   No Known Allergies    Psychosocial History:    Social History  Social History   Marital status:   Spouse name: N/A    Years of education: N/A  Number of children: N/A     Occupational History    retired     Social History Main Topic hepatosplenomegaly  Neurological: grossly intact  Lymphatics: No palpable lymphadenopathy  Skin: +acneform rash on face is mild.  + left anterolateral chest wall incision at site of prior infected cyst-  Is healing without drainage or any surrounding erythe imaging.   Will transition to maintenance necitumumab at this point, dose day 1 today as below:   -necitumumab 800mg IV d1,8    Cycle length is 21 days  -plan to repeat imaging again in about 2 month  -he has multiple potential targets on [employer] anal

## 2017-12-04 PROBLEM — Z72.820 POOR SLEEP: Status: ACTIVE | Noted: 2017-01-01

## 2017-12-04 PROBLEM — R19.8 ALTERNATING CONSTIPATION AND DIARRHEA: Status: ACTIVE | Noted: 2017-01-01

## 2017-12-04 NOTE — PROGRESS NOTES
Patient here for MD f/rajat and treatment. States feeling well. Denies fatigue or SOB. Eating/drinking OK. Denies nausea/emesis. Denies constipation/diarrhea. Denies pain. He is back to waking up earlier.   Able to fall asleep, not stay asleep for a full

## 2017-12-04 NOTE — PATIENT INSTRUCTIONS
For Dr. Cristian Garcia nurse line, call [phone] with any questions or concerns,  Monday through Friday 8:00 to 4:30. After hours or weekends for urgent needs: [phone].   Central [phone]  Medical Records:   [phone]  Cancer Cent

## 2017-12-04 NOTE — PROGRESS NOTES
Hematology/Oncology Clinic Follow Up Visit    Patient Name: Vane Lane  Medical Record Number: [id number]    YOB: 1955    PCP: Dr. Augustine Gordon  Other providers:  Dr. Rylie Vargas (cardiology), Dr. Vince Rangel (rad onc)    Reason for Co brain lesion to 22 gy in 1 fraction (Dr. Kushal Salazar)  -7/18/17- started cis/gem/necitumumab (cisplatin 75mg/m2 IV d1, gemcitabine 1250mg/m2 d1,8, necitumumab 800mg IV d1,8; q21 days)   -omitted necitumumab with C1 due to insurance authorization  -8/7/17- cycle 2 worse time sleeping the last couple weeks since stopping the olanzapine. Falls asleep, but can't stay asleep. Feels okay during the day. Wound from I&D continues to heal without any drainage, no fevers. No bleeding. Is off plavix now. No dyspnea. Comment: anterior lateral abdominal wall  1972: OTHER SURGICAL HISTORY      Comment: RT shoulder reconstruction for shoulder                stabilization.  Had chronic shoulder                dislocations and history of humeral head                fracture Number of children: N/A     Occupational History    retired     Social History Main Topics   Smoking status: Former Smoker  2.00 Packs/day  For 33.00 Years     Quit date: 3/1/2006    Smokeless tobacco: Never Used    Alcohol use No    Drug use: No    Sexual incision at site of prior infected cyst-  Is healing without drainage or any surrounding erythema. No increased warmth.    Lines: +portacath- no erythema, induration, or drainage      Laboratory:    Lab Results  Component Value Date   WBC 9.2 12/04/2017 days  -plan to repeat imaging again after about 2 months of maint necitumumab  -he has multiple potential targets on FoundationOne analysis that could be considered after standard of care options have been exhausted.      *brain metastasis  -s/p SRS to [facility]

## 2017-12-20 NOTE — PROGRESS NOTES
Patient here for MD f/u and treatment. States feeling well. Energy good, denies SOB. Good appetite. Only c/o fingers and toes skin dry and cracking.

## 2017-12-20 NOTE — PATIENT INSTRUCTIONS
For Dr. Marylou Joe nurse line, call [phone] with any questions or concerns,  Monday through Friday 8:00 to 4:30. After hours or weekends for urgent needs: [phone].   Central [phone]  Medical Records:   [phone]  Cancer Cent

## 2017-12-20 NOTE — PATIENT INSTRUCTIONS
Education Record    Learner:  Patient    Disease / Porfirio Kari CANCER    Barriers / Limitations:  None   Comments:    Method:  Brief focused and Reinforcement   Comments:    General Topics:  Medication, Side effects and symptom management and Plan of ca

## 2017-12-20 NOTE — PROGRESS NOTES
Hematology/Oncology Clinic Follow Up Visit    Patient Name: Guillermina Craven  Medical Record Number: [id number]    YOB: 1955    PCP: Dr. Samreen Mahajan  Other providers:  Dr. Janet Luther (cardiology), Dr. Cleveland Bains (rad onc)    Reason for Co brain lesion to 22 gy in 1 fraction (Dr. Priti Fuentes)  -7/18/17- started cis/gem/necitumumab (cisplatin 75mg/m2 IV d1, gemcitabine 1250mg/m2 d1,8, necitumumab 800mg IV d1,8; q21 days)   -omitted necitumumab with C1 due to insurance authorization  -8/7/17- cycle 2 controlled with senna 2 tabs BID and docusate. Energy is fine. No new pains. Has lost a little wt, trying to watch diet and stay physically active. Has gone 2 weeks off topical steroids for facial rash, but has worsened. Remains on minocycline.  Still LEFT      Comment: anterior lateral abdominal wall  1972: OTHER SURGICAL HISTORY      Comment: RT shoulder reconstruction for shoulder                stabilization.  Had chronic shoulder                dislocations and history of humeral head Alcohol use No    Drug use: No    Sexual activity: Not on file     Other Topics Concern    Caffeine Concern Yes    Comment: 3-4cups/daily coffee    Exercise Yes    Comment: walks 2-3 miles a day    Seat Belt Yes     Social History Narrative    , has 37.5 12/20/2017   MCV 98.2 12/20/2017   MCH 33.2 12/20/2017   MCHC 33.9 12/20/2017   RDW 13.7 12/20/2017   .0 12/20/2017   .0 12/04/2017   .0 (L) 11/27/2017       Lab Results  Component Value Date   GLU 86 12/20/2017   BUN 13 12/20/201 to chemotherapy  -remains adequate, improving, will monitor    *constipation  -continue senna and docusate  -add in PO mag as below, may help constipation as well    *chemotherapy induced nausea and vomiting  -resolved now that is on maint chemo only.

## 2017-12-27 NOTE — PROGRESS NOTES
Education Record    Learner:  Patient    Disease / Diagnosis:  NSCLC    Barriers / Limitations:  None   Comments:    Method:  Brief focused   Comments:    General Topics:  Medication, Procedure, Side effects and symptom management and Plan of care reviewed

## 2017-12-27 NOTE — PROGRESS NOTES
MD aware that mg= 1.7. Pt. Instructed to take 1500 mg/day at home, even if he breaks it up into 2 or 3 doses. Pt. Verbalized understanding an will do as instructed. He does understand we will replace IV if he is unable to tolerate the oral mag.

## 2018-01-01 ENCOUNTER — APPOINTMENT (OUTPATIENT)
Dept: HEMATOLOGY/ONCOLOGY | Facility: HOSPITAL | Age: 63
End: 2018-01-01
Attending: INTERNAL MEDICINE
Payer: COMMERCIAL

## 2018-01-01 ENCOUNTER — HOSPITAL ENCOUNTER (OUTPATIENT)
Dept: CT IMAGING | Facility: HOSPITAL | Age: 63
Discharge: HOME | End: 2018-01-01
Attending: INTERNAL MEDICINE
Payer: COMMERCIAL

## 2018-01-01 ENCOUNTER — TELEPHONE (OUTPATIENT)
Dept: HEMATOLOGY/ONCOLOGY | Facility: HOSPITAL | Age: 63
End: 2018-01-01

## 2018-01-01 ENCOUNTER — HOSPITAL ENCOUNTER (OUTPATIENT)
Dept: MRI IMAGING | Facility: HOSPITAL | Age: 63
Discharge: HOME | End: 2018-01-01
Attending: INTERNAL MEDICINE
Payer: COMMERCIAL

## 2018-01-01 VITALS
OXYGEN SATURATION: 99 % | HEIGHT: 66.69 IN | BODY MASS INDEX: 35.95 KG/M2 | TEMPERATURE: 98 F | DIASTOLIC BLOOD PRESSURE: 83 MMHG | RESPIRATION RATE: 18 BRPM | HEART RATE: 59 BPM | SYSTOLIC BLOOD PRESSURE: 137 MMHG | WEIGHT: 226.38 LBS

## 2018-01-01 VITALS
DIASTOLIC BLOOD PRESSURE: 77 MMHG | BODY MASS INDEX: 36.61 KG/M2 | WEIGHT: 230.5 LBS | HEART RATE: 51 BPM | SYSTOLIC BLOOD PRESSURE: 131 MMHG | OXYGEN SATURATION: 95 % | TEMPERATURE: 97 F | RESPIRATION RATE: 18 BRPM | HEIGHT: 66.69 IN

## 2018-01-01 VITALS
DIASTOLIC BLOOD PRESSURE: 71 MMHG | OXYGEN SATURATION: 97 % | TEMPERATURE: 96 F | HEIGHT: 66.69 IN | BODY MASS INDEX: 34.78 KG/M2 | SYSTOLIC BLOOD PRESSURE: 107 MMHG | WEIGHT: 219 LBS | RESPIRATION RATE: 18 BRPM | HEART RATE: 78 BPM

## 2018-01-01 DIAGNOSIS — E83.42 HYPOMAGNESEMIA: ICD-10-CM

## 2018-01-01 DIAGNOSIS — C79.89 NON-SMALL CELL CARCINOMA OF LEFT LUNG METASTATIC TO ABDOMEN (HCC): Primary | ICD-10-CM

## 2018-01-01 DIAGNOSIS — T45.1X5A ANEMIA ASSOCIATED WITH CHEMOTHERAPY: ICD-10-CM

## 2018-01-01 DIAGNOSIS — C34.92 NON-SMALL CELL CARCINOMA OF LEFT LUNG METASTATIC TO ABDOMEN (HCC): Primary | ICD-10-CM

## 2018-01-01 DIAGNOSIS — C79.31 BRAIN METASTASIS (HCC): ICD-10-CM

## 2018-01-01 DIAGNOSIS — R21 RASH: ICD-10-CM

## 2018-01-01 DIAGNOSIS — K59.00 CONSTIPATION, UNSPECIFIED CONSTIPATION TYPE: ICD-10-CM

## 2018-01-01 DIAGNOSIS — T45.1X5A CHANGE OF SKIN RELATED TO CHEMOTHERAPY: ICD-10-CM

## 2018-01-01 DIAGNOSIS — R23.9 CHANGE OF SKIN RELATED TO CHEMOTHERAPY: ICD-10-CM

## 2018-01-01 DIAGNOSIS — D64.81 ANEMIA ASSOCIATED WITH CHEMOTHERAPY: ICD-10-CM

## 2018-01-01 DIAGNOSIS — C34.92 NON-SMALL CELL CARCINOMA OF LEFT LUNG METASTATIC TO ABDOMEN (HCC): ICD-10-CM

## 2018-01-01 DIAGNOSIS — C79.89 NON-SMALL CELL CARCINOMA OF LEFT LUNG METASTATIC TO ABDOMEN (HCC): ICD-10-CM

## 2018-01-01 LAB
ALBUMIN SERPL-MCNC: 3.3 G/DL (ref 3.5–4.8)
ALBUMIN SERPL-MCNC: 3.6 G/DL (ref 3.5–4.8)
ALP LIVER SERPL-CCNC: 55 U/L (ref 45–117)
ALP LIVER SERPL-CCNC: 73 U/L (ref 45–117)
ALT SERPL-CCNC: 24 U/L (ref 17–63)
ALT SERPL-CCNC: 25 U/L (ref 17–63)
AST SERPL-CCNC: 23 U/L (ref 15–41)
AST SERPL-CCNC: 25 U/L (ref 15–41)
BASOPHILS # BLD AUTO: 0.02 X10(3) UL (ref 0–0.1)
BASOPHILS # BLD AUTO: 0.03 X10(3) UL (ref 0–0.1)
BASOPHILS # BLD AUTO: 0.03 X10(3) UL (ref 0–0.1)
BASOPHILS NFR BLD AUTO: 0.3 %
BASOPHILS NFR BLD AUTO: 0.5 %
BASOPHILS NFR BLD AUTO: 0.5 %
BILIRUB SERPL-MCNC: 0.6 MG/DL (ref 0.1–2)
BILIRUB SERPL-MCNC: 0.6 MG/DL (ref 0.1–2)
BUN BLD-MCNC: 13 MG/DL (ref 8–20)
BUN BLD-MCNC: 6 MG/DL (ref 8–20)
BUN BLD-MCNC: 8 MG/DL (ref 8–20)
CALCIUM BLD-MCNC: 8.6 MG/DL (ref 8.3–10.3)
CALCIUM BLD-MCNC: 8.7 MG/DL (ref 8.3–10.3)
CALCIUM BLD-MCNC: 9 MG/DL (ref 8.3–10.3)
CHLORIDE: 107 MMOL/L (ref 101–111)
CHLORIDE: 108 MMOL/L (ref 101–111)
CHLORIDE: 109 MMOL/L (ref 101–111)
CO2: 26 MMOL/L (ref 22–32)
CO2: 27 MMOL/L (ref 22–32)
CO2: 28 MMOL/L (ref 22–32)
CREAT BLD-MCNC: 0.93 MG/DL (ref 0.7–1.3)
CREAT BLD-MCNC: 0.94 MG/DL (ref 0.7–1.3)
CREAT BLD-MCNC: 1.11 MG/DL (ref 0.7–1.3)
EOSINOPHIL # BLD AUTO: 0.05 X10(3) UL (ref 0–0.3)
EOSINOPHIL # BLD AUTO: 0.1 X10(3) UL (ref 0–0.3)
EOSINOPHIL # BLD AUTO: 0.11 X10(3) UL (ref 0–0.3)
EOSINOPHIL NFR BLD AUTO: 0.8 %
EOSINOPHIL NFR BLD AUTO: 1.6 %
EOSINOPHIL NFR BLD AUTO: 1.8 %
ERYTHROCYTE [DISTWIDTH] IN BLOOD BY AUTOMATED COUNT: 12.1 % (ref 11.5–16)
ERYTHROCYTE [DISTWIDTH] IN BLOOD BY AUTOMATED COUNT: 12.4 % (ref 11.5–16)
ERYTHROCYTE [DISTWIDTH] IN BLOOD BY AUTOMATED COUNT: 12.7 % (ref 11.5–16)
GLUCOSE BLD-MCNC: 78 MG/DL (ref 70–99)
GLUCOSE BLD-MCNC: 93 MG/DL (ref 70–99)
GLUCOSE BLD-MCNC: 94 MG/DL (ref 70–99)
HAV IGM SER QL: 1.6 MG/DL (ref 1.7–3)
HAV IGM SER QL: 1.7 MG/DL (ref 1.7–3)
HAV IGM SER QL: 1.7 MG/DL (ref 1.7–3)
HCT VFR BLD AUTO: 38.8 % (ref 37–53)
HCT VFR BLD AUTO: 39.5 % (ref 37–53)
HCT VFR BLD AUTO: 47 % (ref 37–53)
HGB BLD-MCNC: 12.6 G/DL (ref 13–17)
HGB BLD-MCNC: 13.3 G/DL (ref 13–17)
HGB BLD-MCNC: 15.3 G/DL (ref 13–17)
IMMATURE GRANULOCYTE COUNT: 0.01 X10(3) UL (ref 0–1)
IMMATURE GRANULOCYTE COUNT: 0.01 X10(3) UL (ref 0–1)
IMMATURE GRANULOCYTE COUNT: 0.02 X10(3) UL (ref 0–1)
IMMATURE GRANULOCYTE RATIO %: 0.2 %
IMMATURE GRANULOCYTE RATIO %: 0.2 %
IMMATURE GRANULOCYTE RATIO %: 0.3 %
LYMPHOCYTES # BLD AUTO: 1.26 X10(3) UL (ref 0.9–4)
LYMPHOCYTES # BLD AUTO: 1.28 X10(3) UL (ref 0.9–4)
LYMPHOCYTES # BLD AUTO: 1.59 X10(3) UL (ref 0.9–4)
LYMPHOCYTES NFR BLD AUTO: 18.9 %
LYMPHOCYTES NFR BLD AUTO: 21.1 %
LYMPHOCYTES NFR BLD AUTO: 25.9 %
M PROTEIN MFR SERPL ELPH: 6.7 G/DL (ref 6.1–8.3)
M PROTEIN MFR SERPL ELPH: 7.3 G/DL (ref 6.1–8.3)
MCH RBC QN AUTO: 31.1 PG (ref 27–33.2)
MCH RBC QN AUTO: 31.7 PG (ref 27–33.2)
MCH RBC QN AUTO: 31.9 PG (ref 27–33.2)
MCHC RBC AUTO-ENTMCNC: 32.5 G/DL (ref 31–37)
MCHC RBC AUTO-ENTMCNC: 32.6 G/DL (ref 31–37)
MCHC RBC AUTO-ENTMCNC: 33.7 G/DL (ref 31–37)
MCV RBC AUTO: 94.7 FL (ref 80–99)
MCV RBC AUTO: 95.5 FL (ref 80–99)
MCV RBC AUTO: 97.5 FL (ref 80–99)
MONOCYTES # BLD AUTO: 0.42 X10(3) UL (ref 0.1–0.6)
MONOCYTES # BLD AUTO: 0.58 X10(3) UL (ref 0.1–0.6)
MONOCYTES # BLD AUTO: 0.59 X10(3) UL (ref 0.1–0.6)
MONOCYTES NFR BLD AUTO: 6.9 %
MONOCYTES NFR BLD AUTO: 8.9 %
MONOCYTES NFR BLD AUTO: 9.5 %
NEUTROPHIL ABS PRELIM: 3.82 X10 (3) UL (ref 1.3–6.7)
NEUTROPHIL ABS PRELIM: 4.2 X10 (3) UL (ref 1.3–6.7)
NEUTROPHIL ABS PRELIM: 4.72 X10 (3) UL (ref 1.3–6.7)
NEUTROPHILS # BLD AUTO: 3.82 X10(3) UL (ref 1.3–6.7)
NEUTROPHILS # BLD AUTO: 4.2 X10(3) UL (ref 1.3–6.7)
NEUTROPHILS # BLD AUTO: 4.72 X10(3) UL (ref 1.3–6.7)
NEUTROPHILS NFR BLD AUTO: 62.3 %
NEUTROPHILS NFR BLD AUTO: 69.4 %
NEUTROPHILS NFR BLD AUTO: 70.9 %
PLATELET # BLD AUTO: 161 10(3)UL (ref 150–450)
PLATELET # BLD AUTO: 202 10(3)UL (ref 150–450)
PLATELET # BLD AUTO: 211 10(3)UL (ref 150–450)
POTASSIUM SERPL-SCNC: 4 MMOL/L (ref 3.6–5.1)
POTASSIUM SERPL-SCNC: 4.2 MMOL/L (ref 3.6–5.1)
POTASSIUM SERPL-SCNC: 4.2 MMOL/L (ref 3.6–5.1)
RBC # BLD AUTO: 3.98 X10(6)UL (ref 4.3–5.7)
RBC # BLD AUTO: 4.17 X10(6)UL (ref 4.3–5.7)
RBC # BLD AUTO: 4.92 X10(6)UL (ref 4.3–5.7)
RED CELL DISTRIBUTION WIDTH-SD: 42.5 FL (ref 35.1–46.3)
RED CELL DISTRIBUTION WIDTH-SD: 43 FL (ref 35.1–46.3)
RED CELL DISTRIBUTION WIDTH-SD: 45.8 FL (ref 35.1–46.3)
SODIUM SERPL-SCNC: 141 MMOL/L (ref 136–144)
SODIUM SERPL-SCNC: 142 MMOL/L (ref 136–144)
SODIUM SERPL-SCNC: 142 MMOL/L (ref 136–144)
WBC # BLD AUTO: 6.1 X10(3) UL (ref 4–13)
WBC # BLD AUTO: 6.1 X10(3) UL (ref 4–13)
WBC # BLD AUTO: 6.7 X10(3) UL (ref 4–13)

## 2018-01-01 PROCEDURE — 99215 OFFICE O/P EST HI 40 MIN: CPT | Performed by: INTERNAL MEDICINE

## 2018-01-01 PROCEDURE — 85025 COMPLETE CBC W/AUTO DIFF WBC: CPT

## 2018-01-01 PROCEDURE — 96375 TX/PRO/DX INJ NEW DRUG ADDON: CPT

## 2018-01-01 PROCEDURE — 80053 COMPREHEN METABOLIC PANEL: CPT

## 2018-01-01 PROCEDURE — 83735 ASSAY OF MAGNESIUM: CPT

## 2018-01-01 PROCEDURE — 70553 MRI BRAIN STEM W/O & W/DYE: CPT | Performed by: INTERNAL MEDICINE

## 2018-01-01 PROCEDURE — 96413 CHEMO IV INFUSION 1 HR: CPT

## 2018-01-01 PROCEDURE — 80048 BASIC METABOLIC PNL TOTAL CA: CPT

## 2018-01-01 PROCEDURE — 74177 CT ABD & PELVIS W/CONTRAST: CPT | Performed by: INTERNAL MEDICINE

## 2018-01-01 PROCEDURE — 71260 CT THORAX DX C+: CPT | Performed by: INTERNAL MEDICINE

## 2018-01-01 PROCEDURE — A9575 INJ GADOTERATE MEGLUMI 0.1ML: HCPCS | Performed by: INTERNAL MEDICINE

## 2018-01-01 PROCEDURE — 36591 DRAW BLOOD OFF VENOUS DEVICE: CPT

## 2018-01-01 RX ORDER — SODIUM CHLORIDE 0.9 % (FLUSH) 0.9 %
10 SYRINGE (ML) INJECTION ONCE
Status: COMPLETED | OUTPATIENT
Start: 2018-01-01 | End: 2018-01-01

## 2018-01-01 RX ORDER — LORAZEPAM 2 MG/ML
INJECTION INTRAMUSCULAR EVERY 4 HOURS PRN
Status: CANCELLED | OUTPATIENT
Start: 2018-01-01

## 2018-01-01 RX ORDER — PROCHLORPERAZINE MALEATE 10 MG
10 TABLET ORAL EVERY 6 HOURS PRN
Status: CANCELLED | OUTPATIENT
Start: 2018-01-01

## 2018-01-01 RX ORDER — RANITIDINE 25 MG/ML
50 INJECTION, SOLUTION INTRAMUSCULAR; INTRAVENOUS AS NEEDED
Status: CANCELLED | OUTPATIENT
Start: 2018-01-01

## 2018-01-01 RX ORDER — ALBUTEROL SULFATE 90 UG/1
2 AEROSOL, METERED RESPIRATORY (INHALATION) AS NEEDED
Status: CANCELLED | OUTPATIENT
Start: 2018-01-01

## 2018-01-01 RX ORDER — LORAZEPAM 0.5 MG/1
TABLET ORAL EVERY 4 HOURS PRN
Status: CANCELLED | OUTPATIENT
Start: 2018-01-01

## 2018-01-01 RX ORDER — ACETAMINOPHEN 325 MG/1
TABLET ORAL EVERY 6 HOURS PRN
Status: CANCELLED | OUTPATIENT
Start: 2018-01-01

## 2018-01-01 RX ORDER — MINOCYCLINE HYDROCHLORIDE 100 MG/1
100 TABLET ORAL 2 TIMES DAILY
Qty: 180 TABLET | Refills: 3 | Status: SHIPPED | OUTPATIENT
Start: 2018-01-01 | End: 2018-01-01 | Stop reason: ALTCHOICE

## 2018-01-01 RX ORDER — MEPERIDINE HYDROCHLORIDE 25 MG/ML
INJECTION INTRAMUSCULAR; INTRAVENOUS; SUBCUTANEOUS AS NEEDED
Status: CANCELLED | OUTPATIENT
Start: 2018-01-01

## 2018-01-01 RX ORDER — ONDANSETRON 2 MG/ML
8 INJECTION INTRAMUSCULAR; INTRAVENOUS EVERY 6 HOURS PRN
Status: CANCELLED | OUTPATIENT
Start: 2018-01-01

## 2018-01-01 RX ORDER — METOCLOPRAMIDE HYDROCHLORIDE 5 MG/ML
10 INJECTION INTRAMUSCULAR; INTRAVENOUS EVERY 6 HOURS PRN
Status: CANCELLED | OUTPATIENT
Start: 2018-01-01

## 2018-01-01 RX ORDER — SODIUM CHLORIDE 0.9 % (FLUSH) 0.9 %
10 SYRINGE (ML) INJECTION ONCE
Status: CANCELLED | OUTPATIENT
Start: 2018-01-01

## 2018-01-01 RX ORDER — DIPHENHYDRAMINE HYDROCHLORIDE 50 MG/ML
INJECTION INTRAMUSCULAR; INTRAVENOUS EVERY 4 HOURS PRN
Status: CANCELLED | OUTPATIENT
Start: 2018-01-01

## 2018-01-01 RX ADMIN — SODIUM CHLORIDE 0.9 % (FLUSH) 10 ML: 0.9 % SYRINGE (ML) INJECTION at 10:00:00

## 2018-01-01 RX ADMIN — SODIUM CHLORIDE 0.9 % (FLUSH) 10 ML: 0.9 % SYRINGE (ML) INJECTION at 11:03:00

## 2018-01-01 RX ADMIN — SODIUM CHLORIDE 0.9 % (FLUSH) 10 ML: 0.9 % SYRINGE (ML) INJECTION at 17:08:00

## 2018-01-07 NOTE — PROGRESS NOTES
GENERAL SURGERY    Jamarcus Agee MD, FACS, Kendall Bronson. Rafael Johnson MD, Lucio Cheatham MD, FACS  Chapo Ludwig.  Mary Robison MD, [address] [place] [address] Kathya Streeter MD, [facility] FANG Ortega  Elanlarry Balderas PA-C         [address] [place] [address]

## 2018-01-10 NOTE — PROGRESS NOTES
Hematology/Oncology Clinic Follow Up Visit    Patient Name: Joanna Dukes  Medical Record Number: [id number]    YOB: 1955    PCP: Dr. Authur Gosselin  Other providers:  Dr. Johnson Huffman (cardiology), Dr. Sebastien Bolaños (rad onc)    Reason for Co brain lesion to 22 gy in 1 fraction (Dr. Monica Berger)  -7/18/17- started cis/gem/necitumumab (cisplatin 75mg/m2 IV d1, gemcitabine 1250mg/m2 d1,8, necitumumab 800mg IV d1,8; q21 days)   -omitted necitumumab with C1 due to insurance authorization  -8/7/17- cycle 2 toes.  He has been taking mag oxide 300mg TID but the 3rd dose a day isn't well tolerated as causes gas and cramping. Having soft stools, no longer needed docusate or senna since started PO mag. No pains. Energy is fine.   Has continued to gain weight, pl ENDOSCOPY  No date: CYST ASPIRATION LEFT      Comment: anterior lateral abdominal wall  1972: OTHER SURGICAL HISTORY      Comment: RT shoulder reconstruction for shoulder                stabilization.  Had chronic shoulder                dislocations and hi Years     Quit date: 3/1/2006    Smokeless tobacco: Never Used    Alcohol use No    Drug use: No    Sexual activity: Not on file     Other Topics Concern    Caffeine Concern Yes    Comment: 3-4cups/daily coffee    Exercise Yes    Comment: walks 2-3 miles a 12/20/2017   HGB 12.6 (L) 01/10/2018   HGB 12.3 (L) 12/27/2017   HGB 12.7 (L) 12/20/2017   HCT 38.8 01/10/2018   MCV 97.5 01/10/2018   MCH 31.7 01/10/2018   MCHC 32.5 01/10/2018   RDW 12.7 01/10/2018   .0 01/10/2018   .0 12/27/2017   . brain lesion on 7/14/17 with Dr. Nuria Greenwood  -most recent brain MRI shows excellent ongoing response  -obtain next MRI brain after cycle 9- order placed    *anemia- due to chemotherapy  -remains mild, adequate  -monitor CBC, repeat prior to chemo doses    *const

## 2018-01-10 NOTE — PROGRESS NOTES
Patient here for MD f/u. States feeling well, no complaints. Scheduled patient for CT/MRI on 1/30. Orders/contrast/directions given to patient.

## 2018-01-10 NOTE — PATIENT INSTRUCTIONS
For Dr. Tiffany Stinson nurse line, call [phone] with any questions or concerns,  Monday through Friday 8:00 to 4:30. After hours or weekends for urgent needs: [phone].   Central [phone]  Medical Records:   [phone]  Cancer Cent

## 2018-01-19 NOTE — PROGRESS NOTES
Pt here for treatment, it was delayed a few days due to his drug not arriving from the weather. Pt knows to continue with his Mg supplement, since his Mg was 1.6 today. Ok to proceed with treatment.       Education Record    Learner:  Patient    Disease /

## 2018-01-31 NOTE — PROGRESS NOTES
Patient here for MD f/u and treatment. C/O new blood tinged sputum as of yesterday. Occasional loose stools, controlled. Continued rash, cream helping a little. MRI/CT 1/31.   Question from patient, can he have second pneumonia injection schedule for mi

## 2018-01-31 NOTE — PATIENT INSTRUCTIONS
[facility], call [phone] with any questions or concerns,  Monday through Friday 8:00 to 4:30. After hours or weekends for urgent needs: [phone].   Central [phone]  Medical Records:   [phone]  Cancer Cent

## 2018-01-31 NOTE — PROGRESS NOTES
Hematology/Oncology Clinic Follow Up Visit    Patient Name: Fuentes Benoit  Medical Record Number: [id number]    YOB: 1955    PCP: Dr. Viridiana Cerrato  Other providers:  Dr. Emily Yanez (cardiology), Dr. Susan Tariq (rad onc)    Reason for Co brain lesion to 22 gy in 1 fraction (Dr. Pop Mahmood)  -7/18/17- started cis/gem/necitumumab (cisplatin 75mg/m2 IV d1, gemcitabine 1250mg/m2 d1,8, necitumumab 800mg IV d1,8; q21 days)   -omitted necitumumab with C1 due to insurance authorization  -8/7/17- cycle 2 number and size of intrahepatic metastases.    =============================================  Interval events:   Presents for follow up after 3rd maintenance portrazza cycle with repeat imaging which unfortunately shows progression of disease.   He has a mi Tad Bonner MD;  Location: [facility] ENDOSCOPY  No date: CYST ASPIRATION LEFT      Comment: anterior lateral abdominal wall  1972: OTHER SURGICAL HISTORY      Comment: RT shoulder reconstruction for shoulder                stabilization.  Had chronic shoulder use No    Drug use: No    Sexual activity: Not on file     Other Topics Concern    Caffeine Concern Yes    Comment: 3-4cups/daily coffee    Exercise Yes    Comment: walks 2-3 miles a day    Seat Belt Yes     Social History Narrative    , has one carrie 31.1 01/31/2018   MCHC 32.6 01/31/2018   RDW 12.1 01/31/2018   .0 01/31/2018   .0 01/19/2018   .0 01/10/2018       Lab Results  Component Value Date   GLU 93 01/31/2018   BUN 6 (L) 01/31/2018   CREATSERUM 0.93 01/31/2018   CREATSERUM 1 another specialized academic center if he would like his case reviewed by another center.   -in the meantime he is agreeable to proceeding with plan to start nivolumab once authorized by his insurance.   Will dose as below:   -nivolumab 240mg IV d1   Cycle

## 2018-02-22 NOTE — TELEPHONE ENCOUNTER
Pt had increased hemoptysis 1-2 weeks ago, stopped eliquis.   Saw Dr. Robin Bullock at [facility], his case was discussed in their lung tumor board with recommendation to give 10 fractions of palliative RT to lung mass to stop bleeding, then to proceed with either cli

## 2018-10-24 ENCOUNTER — TELEPHONE (OUTPATIENT)
Dept: FAMILY MEDICINE CLINIC | Facility: CLINIC | Age: 63
End: 2018-10-24

## 2021-11-01 NOTE — LETTER
07/20/17        Farrah Duffy  [address] [facility]       Dear Jaqui Garcia,    [address] records indicate that you have outstanding lab work and or testing that was ordered for you and has not yet been completed:      [address] [facility] ]
Ms Adams

## (undated) NOTE — MR AVS SNAPSHOT
[facility] [place]  [address] [facility] [address] [phone]               Thank you for choosing us for your health care visit with Michela Sever, DO. We are glad to serve you and happy to provide you with this summary of your visit.   Pl Return if symptoms worsen or fail to improve.       Scheduling Instructions     Monday June 19, 2017     Imaging:  CT ANGIOGRAPHY, CHEST (SSP=[id number])    Instructions:  IMPORTANT    Your physician has ordered a radiology test that may require authorization f Hemoptysis x 9 mos worse on eliquis and plavix. Assoc Dx:   Hemoptysis [R04.2], Coronary artery disease involving native coronary artery of native heart without angina pectoris [I25.10]          Reason for Today's Visit     Spitting Up           Medical · Coughing up an increased amount of blood  · Trouble breathing, wheezing, or pain with breathing  · Chest pain or chest pressure  · Fainting or losing consciousness  · Rapid heartbeat  · Weakness or dizziness  Date Last Reviewed: 9/13/2015 © 2000-2016 Th office, you can view your past visit information in [employer] by going to Visits < Visit Summaries. [employer] questions? Call [phone] for help. [employer] is NOT to be used for urgent needs. For medical emergencies, dial 911.            Visit [facility]

## (undated) NOTE — IP AVS SNAPSHOT
[facility] [place] [address] [place], [address] ~ [phone]                Discharge Summary   2/8/2017    Florina Cummins           Admission Information        Provider Department    2/8/2017 Arsen De La Torre MD  2ne-A Instructions Authorizing Provider    Morning Afternoon Evening As Needed    apixaban 5 MG Tabs   Commonly known as:  ELIQUIS        Take 1 tablet (5 mg total) by mouth 2 (two) times daily.     Lazaro BARBER                              carvedilol 6.25 14.6 (02/09/17)  43.4 (02/09/17)  90.4 (02/09/17)  30.4 (02/09/17)  33.6 -- (02/09/17)  168.0 --    (11/18/16)  7.5 (11/18/16)  5.20 (11/18/16)  15.8 (11/18/16)  45.8 (11/18/16)  88.1 (11/18/16)  30.4 (11/18/16)  34.5  (11/18/16)  236.0       Recent [employer] plans. To get signed up and covered, please call [phone] and ask to get set up for an insurance coverage that is in-network with Cresencio Salgado.         MyChart     Visit [employer]  You can access your MyChart to more actively manage Use: Lower cholesterol, protect your heart   Most common side effects: Dizziness, constipation, abnormal liver function   What to report to your healthcare team:  Dizziness, muscle aches, constipation           Blood Thinners (Anticoagulants)     apixaban

## (undated) NOTE — LETTER
Printed: 2017    Patient Name: Florina Cummins  : 1955   Medical Record #: [id number]    Consent to Chemotherapy    I, Florina Cummins, understand that I have been diagnosed with lung cancer    I understand that the treatment suggested by my d Patient Signature                                                            Date      For patients requiring translation or verbal reading of this document, the person reading/translating should document and sign below:    [place]/ Signature

## (undated) NOTE — MR AVS SNAPSHOT
After Visit Summary   6/27/2017    Cassia [place]    MRN: [id number]           Visit Information     Date & Time  6/27/2017  9:30 AM Provider  [facility] Ultrasound Dept.  Phone  [phone] Were     BP headache and pink eye. The cost for a Video Visit is currently $10.         If you receive a survey from [employer], please take a few minutes to complete it and provide feedback.  We strive to deliver the best patient experience and are looking for ways

## (undated) NOTE — LETTER
17    Patient: Leo Kapoor  : 1955 Visit date: 2017    Dear  Dr. Raleigh Wheeler MD,    Thank you for referring Leo Kapoor to my practice. Please find my assessment and plan below.       Assessment:      The patient is a examination. Continue antibiotics for cellulitis and erythema. Thank you for allowing me to participate in your patient's care.                       Sincerely,       Eduardo Logan MD   CC: Silke Bustillos MD

## (undated) NOTE — MR AVS SNAPSHOT
After Visit Summary   6/21/2017    Juan M Lira    MRN: [id number]           Diagnoses this Visit     Liver masses    -  Primary     Lung mass           Allergies     No Known Allergies      Your Vital Signs Were     BP Pulse Temp(Src) Resp    12 view more details from this visit by going to [email or web address]. If you've recently had a stay at the Hospital you can access your discharge instructions in [employer] by going to Visits < Admission Summaries.  If you've been to the Emergency Depar